# Patient Record
Sex: FEMALE | Race: BLACK OR AFRICAN AMERICAN | NOT HISPANIC OR LATINO | Employment: OTHER | URBAN - METROPOLITAN AREA
[De-identification: names, ages, dates, MRNs, and addresses within clinical notes are randomized per-mention and may not be internally consistent; named-entity substitution may affect disease eponyms.]

---

## 2017-08-23 ENCOUNTER — HISTORICAL (OUTPATIENT)
Dept: LAB | Facility: HOSPITAL | Age: 77
End: 2017-08-23

## 2017-08-23 LAB
CREAT UR-MCNC: 97.9 MG/DL
MICROALBUMIN UR-MCNC: 0.6 MG/DL
MICROALBUMIN/CREAT RATIO PNL UR: 6.1 MG/GM CR (ref 0–30)

## 2017-10-18 ENCOUNTER — HISTORICAL (OUTPATIENT)
Dept: LAB | Facility: HOSPITAL | Age: 77
End: 2017-10-18

## 2017-10-18 LAB
APPEARANCE, UA: ABNORMAL
BACTERIA SPEC CULT: ABNORMAL /HPF
BILIRUB SERPL-MCNC: NEGATIVE MG/DL
BILIRUB UR QL STRIP: ABNORMAL
BLOOD URINE, POC: NEGATIVE
CLARITY, POC UA: CLEAR
COLOR UR: YELLOW
COLOR, POC UA: YELLOW
GLUCOSE (UA): NEGATIVE
GLUCOSE UR QL STRIP: NEGATIVE
HGB UR QL STRIP: NEGATIVE
KETONES UR QL STRIP: NEGATIVE
KETONES UR QL STRIP: NEGATIVE
LEUKOCYTE EST, POC UA: NEGATIVE
LEUKOCYTE ESTERASE UR QL STRIP: NEGATIVE
NITRITE UR QL STRIP: NEGATIVE
NITRITE, POC UA: NEGATIVE
PH UR STRIP: 6 [PH] (ref 5–9)
PH, POC UA: 6
PROT UR QL STRIP: NEGATIVE
PROTEIN, POC: NEGATIVE
RBC #/AREA URNS HPF: ABNORMAL /[HPF]
SP GR UR STRIP: 1.02 (ref 1–1.03)
SPECIFIC GRAVITY, POC UA: 1.01
SQUAMOUS EPITHELIAL, UA: 12 /HPF (ref 0–4)
UROBILINOGEN UR STRIP-ACNC: 0.2
UROBILINOGEN, POC UA: NORMAL
WBC #/AREA URNS HPF: ABNORMAL /[HPF]

## 2017-10-26 ENCOUNTER — HISTORICAL (OUTPATIENT)
Dept: LAB | Facility: HOSPITAL | Age: 77
End: 2017-10-26

## 2017-10-26 LAB
APPEARANCE, UA: CLEAR
BACTERIA SPEC CULT: NORMAL /HPF
BILIRUB UR QL STRIP: NEGATIVE
COLOR UR: YELLOW
GLUCOSE (UA): NEGATIVE
HGB UR QL STRIP: NEGATIVE
KETONES UR QL STRIP: NEGATIVE
LEUKOCYTE ESTERASE UR QL STRIP: NEGATIVE
NITRITE UR QL STRIP: NEGATIVE
PH UR STRIP: 6 [PH] (ref 5–9)
PROT UR QL STRIP: NEGATIVE
RBC #/AREA URNS HPF: NORMAL /[HPF]
SP GR UR STRIP: 1.02 (ref 1–1.03)
SQUAMOUS EPITHELIAL, UA: NORMAL
UROBILINOGEN UR STRIP-ACNC: 0.2
WBC #/AREA URNS HPF: NORMAL /[HPF]

## 2017-11-27 ENCOUNTER — HISTORICAL (OUTPATIENT)
Dept: LAB | Facility: HOSPITAL | Age: 77
End: 2017-11-27

## 2017-11-29 LAB — FINAL CULTURE: NO GROWTH

## 2018-08-29 LAB
BILIRUB SERPL-MCNC: NEGATIVE MG/DL
BLOOD URINE, POC: NEGATIVE
CLARITY, POC UA: CLEAR
COLOR, POC UA: YELLOW
GLUCOSE UR QL STRIP: NEGATIVE
KETONES UR QL STRIP: NEGATIVE
LEUKOCYTE EST, POC UA: NEGATIVE
NITRITE, POC UA: NEGATIVE
PH, POC UA: 5.5
PROTEIN, POC: NEGATIVE
SPECIFIC GRAVITY, POC UA: 1
UROBILINOGEN, POC UA: NORMAL

## 2018-11-29 ENCOUNTER — HISTORICAL (OUTPATIENT)
Dept: LAB | Facility: HOSPITAL | Age: 78
End: 2018-11-29

## 2018-11-29 LAB
APPEARANCE, UA: CLEAR
BACTERIA SPEC CULT: ABNORMAL /HPF
BILIRUB UR QL STRIP: NEGATIVE
COLOR UR: ABNORMAL
GLUCOSE (UA): NEGATIVE
HGB UR QL STRIP: NEGATIVE
KETONES UR QL STRIP: NEGATIVE
LEUKOCYTE ESTERASE UR QL STRIP: ABNORMAL
NITRITE UR QL STRIP: NEGATIVE
PH UR STRIP: 6 [PH] (ref 5–9)
PROT UR QL STRIP: NEGATIVE
RBC #/AREA URNS HPF: ABNORMAL /[HPF]
SP GR UR STRIP: 1.02 (ref 1–1.03)
SQUAMOUS EPITHELIAL, UA: 11 /HPF (ref 0–4)
UROBILINOGEN UR STRIP-ACNC: 0.2
WBC #/AREA URNS HPF: ABNORMAL /[HPF]

## 2018-11-30 LAB — FINAL CULTURE: NORMAL

## 2019-06-13 LAB
ALBUMIN SERPL-MCNC: 4 G/DL (ref 3.5–4.8)
ALBUMIN/GLOB SERPL: 1.4 {RATIO} (ref 1.2–2.2)
ALP SERPL-CCNC: 60 IU/L (ref 39–117)
ALT SERPL-CCNC: 14 IU/L (ref 0–32)
AST SERPL-CCNC: 16 IU/L (ref 0–40)
BILIRUB SERPL-MCNC: 0.4 MG/DL (ref 0–1.2)
BUN SERPL-MCNC: 19 MG/DL (ref 8–27)
CALCIUM SERPL-MCNC: 9.5 MG/DL (ref 8.7–10.3)
CHLORIDE SERPL-SCNC: 102 MMOL/L (ref 96–106)
CO2 SERPL-SCNC: 25 MMOL/L (ref 20–29)
CREAT SERPL-MCNC: 0.92 MG/DL (ref 0.57–1)
CREAT/UREA NIT SERPL: 21 (ref 12–28)
GLOBULIN SER-MCNC: 2.8 G/DL (ref 1.5–4.5)
GLUCOSE SERPL-MCNC: 124 MG/DL (ref 65–99)
HBA1C MFR BLD: 6.4 % (ref 4.8–5.6)
POTASSIUM SERPL-SCNC: 4.7 MMOL/L (ref 3.5–5.2)
PROT SERPL-MCNC: 6.8 G/DL (ref 6–8.5)
SODIUM SERPL-SCNC: 140 MMOL/L (ref 134–144)

## 2019-10-07 ENCOUNTER — HISTORICAL (OUTPATIENT)
Dept: LAB | Facility: HOSPITAL | Age: 79
End: 2019-10-07

## 2019-10-07 LAB
ABS NEUT (OLG): 1.2 X10(3)/MCL (ref 2.1–9.2)
ALBUMIN SERPL-MCNC: 3.6 GM/DL (ref 3.4–5)
ALBUMIN/GLOB SERPL: 1.2 {RATIO}
ALP SERPL-CCNC: 59 UNIT/L (ref 38–126)
ALT SERPL-CCNC: 19 UNIT/L (ref 12–78)
APPEARANCE, UA: CLEAR
AST SERPL-CCNC: 15 UNIT/L (ref 15–37)
BACTERIA SPEC CULT: ABNORMAL /HPF
BASOPHILS # BLD AUTO: 0 X10(3)/MCL (ref 0–0.2)
BASOPHILS NFR BLD AUTO: 1 %
BILIRUB SERPL-MCNC: 0.7 MG/DL (ref 0.2–1)
BILIRUB UR QL STRIP: NEGATIVE
BILIRUBIN DIRECT+TOT PNL SERPL-MCNC: 0.1 MG/DL (ref 0–0.2)
BILIRUBIN DIRECT+TOT PNL SERPL-MCNC: 0.6 MG/DL (ref 0–0.8)
BUN SERPL-MCNC: 20 MG/DL (ref 7–18)
CALCIUM SERPL-MCNC: 9 MG/DL (ref 8.5–10.1)
CHLORIDE SERPL-SCNC: 106 MMOL/L (ref 98–107)
CHOLEST SERPL-MCNC: 248 MG/DL (ref 0–200)
CHOLEST/HDLC SERPL: 4.4 {RATIO} (ref 0–4)
CO2 SERPL-SCNC: 29 MMOL/L (ref 21–32)
COLOR UR: YELLOW
CREAT SERPL-MCNC: 0.84 MG/DL (ref 0.55–1.02)
DEPRECATED CALCIDIOL+CALCIFEROL SERPL-MC: 21.39 NG/ML (ref 30–80)
EOSINOPHIL # BLD AUTO: 0 X10(3)/MCL (ref 0–0.9)
EOSINOPHIL NFR BLD AUTO: 2 %
ERYTHROCYTE [DISTWIDTH] IN BLOOD BY AUTOMATED COUNT: 13.4 % (ref 11.5–17)
EST. AVERAGE GLUCOSE BLD GHB EST-MCNC: 137 MG/DL
GLOBULIN SER-MCNC: 3.1 GM/DL (ref 2.4–3.5)
GLUCOSE (UA): NEGATIVE
GLUCOSE SERPL-MCNC: 115 MG/DL (ref 74–106)
HBA1C MFR BLD: 6.4 % (ref 4.2–6.3)
HCT VFR BLD AUTO: 39 % (ref 37–47)
HDLC SERPL-MCNC: 56 MG/DL (ref 35–60)
HGB BLD-MCNC: 11.7 GM/DL (ref 12–16)
HGB UR QL STRIP: NEGATIVE
KETONES UR QL STRIP: NEGATIVE
LDLC SERPL CALC-MCNC: 173 MG/DL (ref 0–129)
LEUKOCYTE ESTERASE UR QL STRIP: NEGATIVE
LYMPHOCYTES # BLD AUTO: 1.4 X10(3)/MCL (ref 0.6–4.6)
LYMPHOCYTES NFR BLD AUTO: 46 %
MCH RBC QN AUTO: 29.7 PG (ref 27–31)
MCHC RBC AUTO-ENTMCNC: 30 GM/DL (ref 33–36)
MCV RBC AUTO: 99 FL (ref 80–94)
MONOCYTES # BLD AUTO: 0.4 X10(3)/MCL (ref 0.1–1.3)
MONOCYTES NFR BLD AUTO: 12 %
NEUTROPHILS # BLD AUTO: 1.2 X10(3)/MCL (ref 2.1–9.2)
NEUTROPHILS NFR BLD AUTO: 39 %
NITRITE UR QL STRIP: NEGATIVE
PH UR STRIP: 5 [PH] (ref 5–9)
PLATELET # BLD AUTO: 307 X10(3)/MCL (ref 130–400)
PMV BLD AUTO: 9.9 FL (ref 9.4–12.4)
POTASSIUM SERPL-SCNC: 4.3 MMOL/L (ref 3.5–5.1)
PROT SERPL-MCNC: 6.7 GM/DL (ref 6.4–8.2)
PROT UR QL STRIP: NEGATIVE
RBC # BLD AUTO: 3.94 X10(6)/MCL (ref 4.2–5.4)
RBC #/AREA URNS HPF: ABNORMAL /[HPF]
SODIUM SERPL-SCNC: 139 MMOL/L (ref 136–145)
SP GR UR STRIP: 1.02 (ref 1–1.03)
SQUAMOUS EPITHELIAL, UA: 10 /HPF (ref 0–4)
TRIGL SERPL-MCNC: 95 MG/DL (ref 30–150)
TSH SERPL-ACNC: 2.25 MIU/L (ref 0.36–3.74)
UROBILINOGEN UR STRIP-ACNC: 0.2
VLDLC SERPL CALC-MCNC: 19 MG/DL
WBC # SPEC AUTO: 3.1 X10(3)/MCL (ref 4.5–11.5)
WBC #/AREA URNS HPF: 5 /HPF (ref 0–3)

## 2019-10-09 LAB — FINAL CULTURE: NORMAL

## 2021-01-11 LAB
ALBUMIN SERPL-MCNC: 4.3 G/DL (ref 3.7–4.7)
ALBUMIN/GLOB SERPL: 1.4 {RATIO} (ref 1.2–2.2)
ALP SERPL-CCNC: 64 IU/L (ref 39–117)
ALT SERPL-CCNC: 11 IU/L (ref 0–32)
AST SERPL-CCNC: 16 IU/L (ref 0–40)
BASOPHILS # BLD AUTO: 0 X10E3/UL (ref 0–0.2)
BASOPHILS NFR BLD AUTO: 1 %
BILIRUB SERPL-MCNC: 0.3 MG/DL (ref 0–1.2)
BUN SERPL-MCNC: 18 MG/DL (ref 8–27)
CALCIUM SERPL-MCNC: 9.7 MG/DL (ref 8.7–10.3)
CHLORIDE SERPL-SCNC: 100 MMOL/L (ref 96–106)
CHOLEST SERPL-MCNC: 218 MG/DL (ref 100–199)
CHOLEST/HDLC SERPL: 3.6 RATIO (ref 0–4.4)
CO2 SERPL-SCNC: 25 MMOL/L (ref 20–29)
CREAT SERPL-MCNC: 1.04 MG/DL (ref 0.57–1)
CREAT/UREA NIT SERPL: 17 (ref 12–28)
DEPRECATED CALCIDIOL+CALCIFEROL SERPL-MC: 31.5 NG/ML (ref 30–100)
EOSINOPHIL # BLD AUTO: 0 X10E3/UL (ref 0–0.4)
EOSINOPHIL NFR BLD AUTO: 1 %
ERYTHROCYTE [DISTWIDTH] IN BLOOD BY AUTOMATED COUNT: 13.5 % (ref 11.7–15.4)
GLOBULIN SER-MCNC: 3 G/DL (ref 1.5–4.5)
GLUCOSE SERPL-MCNC: 123 MG/DL (ref 65–99)
HBA1C MFR BLD: 6.1 % (ref 4.8–5.6)
HCT VFR BLD AUTO: 40.4 % (ref 34–46.6)
HDLC SERPL-MCNC: 61 MG/DL
HGB BLD-MCNC: 12.4 G/DL (ref 11.1–15.9)
LDLC SERPL CALC-MCNC: 138 MG/DL (ref 0–99)
LYMPHOCYTES # BLD AUTO: 1.3 X10E3/UL (ref 0.7–3.1)
LYMPHOCYTES NFR BLD AUTO: 33 %
MCH RBC QN AUTO: 30.4 PG (ref 26.6–33)
MCHC RBC AUTO-ENTMCNC: 30.7 G/DL (ref 31.5–35.7)
MCV RBC AUTO: 99 FL (ref 79–97)
MICROALBUMIN/CREAT RATIO PNL UR: 15 MG/G CREAT (ref 0–29)
MONOCYTES # BLD AUTO: 0.5 X10E3/UL (ref 0.1–0.9)
MONOCYTES NFR BLD AUTO: 13 %
NEUTROPHILS # BLD AUTO: 2.1 X10E3/UL (ref 1.4–7)
NEUTROPHILS NFR BLD AUTO: 52 %
PLATELET # BLD AUTO: 337 X10E3/UL (ref 150–450)
POTASSIUM SERPL-SCNC: 4.7 MMOL/L (ref 3.5–5.2)
PROT SERPL-MCNC: 7.3 G/DL (ref 6–8.5)
RBC # BLD AUTO: 4.08 X10(6)/MCL (ref 3.77–5.28)
SODIUM SERPL-SCNC: 140 MMOL/L (ref 134–144)
TRIGL SERPL-MCNC: 110 MG/DL (ref 0–149)
TSH SERPL-ACNC: 4.3 MIU/ML (ref 0.45–4.5)
VLDLC SERPL CALC-MCNC: 19 MG/DL (ref 5–40)
WBC # SPEC AUTO: 4 X10E3/UL (ref 3.4–10.8)

## 2022-04-11 ENCOUNTER — HISTORICAL (OUTPATIENT)
Dept: ADMINISTRATIVE | Facility: HOSPITAL | Age: 82
End: 2022-04-11
Payer: MEDICARE

## 2022-04-24 VITALS
HEIGHT: 63 IN | BODY MASS INDEX: 47.97 KG/M2 | WEIGHT: 270.75 LBS | OXYGEN SATURATION: 98 % | SYSTOLIC BLOOD PRESSURE: 135 MMHG | DIASTOLIC BLOOD PRESSURE: 80 MMHG

## 2022-05-06 ENCOUNTER — HISTORICAL (OUTPATIENT)
Dept: ADMINISTRATIVE | Facility: HOSPITAL | Age: 82
End: 2022-05-06
Payer: MEDICARE

## 2022-05-17 RX ORDER — PRAVASTATIN SODIUM 80 MG/1
80 TABLET ORAL DAILY
COMMUNITY
End: 2023-03-23

## 2022-05-17 NOTE — TELEPHONE ENCOUNTER
----- Message from Nellie Ramírez sent at 5/17/2022 12:26 PM CDT -----  Regarding: Refill  Type:  RX Refill Request    Who Called: Patient  Refill or New Rx:refill  RX Name and Strength: Pravastatin.... 80mg  How is the patient currently taking it? (ex. 1XDay):every night  Is this a 30 day or 90 day RX:90  Preferred Pharmacy with phone number:[facility]  Local or Mail Order:  Ordering Provider: dR Cortez  Would the patient rather a call back or a response via [facility]?   Best Call Back Number:[phone]  Additional Information:

## 2022-05-18 RX ORDER — PRAVASTATIN SODIUM 80 MG/1
80 TABLET ORAL DAILY
Qty: 90 TABLET | Refills: 3 | OUTPATIENT
Start: 2022-05-18

## 2022-06-02 ENCOUNTER — TELEPHONE (OUTPATIENT)
Dept: FAMILY MEDICINE | Facility: CLINIC | Age: 82
End: 2022-06-02
Payer: MEDICARE

## 2022-06-02 DIAGNOSIS — Z12.31 VISIT FOR SCREENING MAMMOGRAM: Primary | ICD-10-CM

## 2022-06-02 NOTE — TELEPHONE ENCOUNTER
----- Message from Efrain Wright MA sent at 6/2/2022  3:36 PM CDT -----  Regarding: FW: mammo order    ----- Message -----  From: Ashley Dickerson Patient Care Assistant  Sent: 6/2/2022   3:20 PM CDT  To: Marisa BEARD Staff  Subject: mammo order                                      Type:  Mammogram    Caller is requesting to schedule their annual mammogram appointment.  Order is not listed in EPIC.  Please enter order and contact patient to schedule.  Name of Caller: pt  Where would they like the mammogram performed? [facility]  Would the patient rather a call back or a response via [facility]? C/b  Best Call Back Number: [phone]  Additional Information: please put in pts order for mammogram she has an gilberto in the morning to get this done.

## 2022-09-21 ENCOUNTER — HISTORICAL (OUTPATIENT)
Dept: ADMINISTRATIVE | Facility: HOSPITAL | Age: 82
End: 2022-09-21
Payer: MEDICARE

## 2022-11-17 LAB
LEFT EYE DM RETINOPATHY: NEGATIVE
RIGHT EYE DM RETINOPATHY: NEGATIVE

## 2023-03-23 ENCOUNTER — OFFICE VISIT (OUTPATIENT)
Dept: FAMILY MEDICINE | Facility: CLINIC | Age: 83
End: 2023-03-23
Payer: MEDICARE

## 2023-03-23 VITALS
DIASTOLIC BLOOD PRESSURE: 78 MMHG | RESPIRATION RATE: 17 BRPM | HEART RATE: 63 BPM | WEIGHT: 270.88 LBS | HEIGHT: 65 IN | SYSTOLIC BLOOD PRESSURE: 138 MMHG | OXYGEN SATURATION: 99 % | TEMPERATURE: 98 F | BODY MASS INDEX: 45.13 KG/M2

## 2023-03-23 DIAGNOSIS — E66.9 DIABETES MELLITUS TYPE 2 IN OBESE: ICD-10-CM

## 2023-03-23 DIAGNOSIS — N90.7 VULVAR CYST: Primary | ICD-10-CM

## 2023-03-23 DIAGNOSIS — E11.69 DIABETES MELLITUS TYPE 2 IN OBESE: ICD-10-CM

## 2023-03-23 DIAGNOSIS — E78.2 MIXED HYPERLIPIDEMIA: ICD-10-CM

## 2023-03-23 PROCEDURE — 3078F PR MOST RECENT DIASTOLIC BLOOD PRESSURE < 80 MM HG: ICD-10-PCS | Mod: CPTII,,, | Performed by: FAMILY MEDICINE

## 2023-03-23 PROCEDURE — 3075F SYST BP GE 130 - 139MM HG: CPT | Mod: CPTII,,, | Performed by: FAMILY MEDICINE

## 2023-03-23 PROCEDURE — 1160F PR REVIEW ALL MEDS BY PRESCRIBER/CLIN PHARMACIST DOCUMENTED: ICD-10-PCS | Mod: CPTII,,, | Performed by: FAMILY MEDICINE

## 2023-03-23 PROCEDURE — 99213 OFFICE O/P EST LOW 20 MIN: CPT | Mod: ,,, | Performed by: FAMILY MEDICINE

## 2023-03-23 PROCEDURE — 1160F RVW MEDS BY RX/DR IN RCRD: CPT | Mod: CPTII,,, | Performed by: FAMILY MEDICINE

## 2023-03-23 PROCEDURE — 1159F PR MEDICATION LIST DOCUMENTED IN MEDICAL RECORD: ICD-10-PCS | Mod: CPTII,,, | Performed by: FAMILY MEDICINE

## 2023-03-23 PROCEDURE — 3075F PR MOST RECENT SYSTOLIC BLOOD PRESS GE 130-139MM HG: ICD-10-PCS | Mod: CPTII,,, | Performed by: FAMILY MEDICINE

## 2023-03-23 PROCEDURE — 3078F DIAST BP <80 MM HG: CPT | Mod: CPTII,,, | Performed by: FAMILY MEDICINE

## 2023-03-23 PROCEDURE — 99213 PR OFFICE/OUTPT VISIT, EST, LEVL III, 20-29 MIN: ICD-10-PCS | Mod: ,,, | Performed by: FAMILY MEDICINE

## 2023-03-23 PROCEDURE — 1159F MED LIST DOCD IN RCRD: CPT | Mod: CPTII,,, | Performed by: FAMILY MEDICINE

## 2023-03-23 PROCEDURE — 1101F PT FALLS ASSESS-DOCD LE1/YR: CPT | Mod: CPTII,,, | Performed by: FAMILY MEDICINE

## 2023-03-23 PROCEDURE — 3288F PR FALLS RISK ASSESSMENT DOCUMENTED: ICD-10-PCS | Mod: CPTII,,, | Performed by: FAMILY MEDICINE

## 2023-03-23 PROCEDURE — 3288F FALL RISK ASSESSMENT DOCD: CPT | Mod: CPTII,,, | Performed by: FAMILY MEDICINE

## 2023-03-23 PROCEDURE — 1126F PR PAIN SEVERITY QUANTIFIED, NO PAIN PRESENT: ICD-10-PCS | Mod: CPTII,,, | Performed by: FAMILY MEDICINE

## 2023-03-23 PROCEDURE — 1101F PR PT FALLS ASSESS DOC 0-1 FALLS W/OUT INJ PAST YR: ICD-10-PCS | Mod: CPTII,,, | Performed by: FAMILY MEDICINE

## 2023-03-23 PROCEDURE — 1126F AMNT PAIN NOTED NONE PRSNT: CPT | Mod: CPTII,,, | Performed by: FAMILY MEDICINE

## 2023-03-23 RX ORDER — PRAVASTATIN SODIUM 40 MG/1
40 TABLET ORAL DAILY
Qty: 30 TABLET | Refills: 3 | Status: SHIPPED | OUTPATIENT
Start: 2023-03-23 | End: 2023-04-03

## 2023-03-23 NOTE — PROGRESS NOTES
Subjective:      Patient ID: Desire Connors is a 82 y.o. female.    Chief Complaint: Vaginal Cyst    Disclaimer:  This note is prepared using voice recognition software and as such is likely to have errors despite attempts at proofreading. Please contact me for questions.     82-year-old female who presents for vaginal nodule/cyst that she states has been present for multiple weeks.  She denies any pain, bleeding or discharge.  She also denies any pruritis.  The patient states that she palpated the nodule and was concerned.  Patient has a history of hyperlipidemia, diabetes mellitus type 2, hypertension and microalbuminuria.  The patient requests to change Crestor back to pravastatin.  She states that since starting Crestor she has had myalgias and she is unsure of why the medication was changed.  She denies any side effects while taking pravastatin.    Past Medical History:   Diagnosis Date    CAD (coronary artery disease)     Chronic sinusitis, unspecified     Diabetic neuropathy     Essential (primary) hypertension     GERD (gastroesophageal reflux disease)     Hiatal hernia     Hypercholesteremia     Microalbuminuria     Obesity, unspecified     PAD (peripheral artery disease)     Peripheral edema     Seasonal allergic rhinitis     Type 2 diabetes mellitus without complications     Unspecified cataract     Unspecified dementia without behavioral disturbance     Vitamin D deficiency         Current Outpatient Medications on File Prior to Visit   Medication Sig Dispense Refill    aspirin (ECOTRIN) 81 MG EC tablet Take 81 mg by mouth once daily.      blood sugar diagnostic Strp 100 each by Other route once daily. USE TO CHECK FASTING CBG      blood-glucose meter kit 1 each by Other route once daily. USE TO CHECK CBG      donepeziL (ARICEPT) 10 MG tablet TAKE 1 TABLET BY MOUTH EVERYDAY AT BEDTIME 90 tablet 3    fluticasone propionate (FLONASE) 50 mcg/actuation nasal spray USE 2 SPRAYS IN EACH NOSTRIL DAILY AS NEEDED  FOR ALLERGY SYMPTOMS 16 mL 11    furosemide (LASIX) 20 MG tablet Take 20 mg by mouth once daily.      gabapentin (NEURONTIN) 300 MG capsule TAKE 1 CAP TWICE DAILY AS NEEDED FOR NERVE PAIN 60 capsule 5    hydrALAZINE (APRESOLINE) 100 MG tablet Take 100 mg by mouth 3 (three) times daily.      JARDIANCE 10 mg tablet Take 10 mg by mouth once daily.      lancets Misc 100 each by Other route once daily. USE TO CHECK FASTING CBG      losartan (COZAAR) 100 MG tablet TAKE 1 TABLET BY MOUTH EVERY DAY 90 tablet 3    pioglitazone (ACTOS) 15 MG tablet TAKE 1 TABLET BY MOUTH EVERY DAY 90 tablet 3    rosuvastatin (CRESTOR) 40 MG Tab Take 40 mg by mouth once daily.      vitamin D (VITAMIN D3) 1000 units Tab Take 1 tablet by mouth once daily.      amLODIPine (NORVASC) 5 MG tablet Take 1 tablet by mouth once daily.      diclofenac (VOLTAREN) 75 MG EC tablet Take 75 mg by mouth 2 (two) times daily as needed.      pantoprazole (PROTONIX) 40 MG tablet Take 1 tablet by mouth once daily.      prasugreL (EFFIENT) 10 mg Tab Take 10 mg by mouth once daily.      pravastatin (PRAVACHOL) 80 MG tablet Take 80 mg by mouth once daily.       No current facility-administered medications on file prior to visit.        Review of patient's allergies indicates:   Allergen Reactions    Ciprofloxacin Swelling     Other reaction(s): Swelling    Sulfa (sulfonamide antibiotics)         Review of Systems   Constitutional:  Negative for chills, diaphoresis and fever.   Eyes:  Negative for blurred vision and double vision.   Respiratory:  Negative for cough and shortness of breath.    Cardiovascular:  Negative for chest pain and leg swelling.   Gastrointestinal:  Negative for abdominal pain, diarrhea, nausea and vomiting.   Genitourinary:         Vulvar nodule   Skin:  Negative for rash.   Neurological:  Negative for dizziness, tremors and headaches.     Objective:     Vitals:    03/23/23 1319   BP: 138/78   BP Location: Right arm   Patient Position: Sitting  "  BP Method: Large (Manual)   Pulse: 63   Resp: 17   Temp: 97.9 °F (36.6 °C)   TempSrc: Oral   SpO2: 99%   Weight: 122.9 kg (270 lb 14.4 oz)   Height: 5' 5" (1.651 m)     Physical Exam  Exam conducted with a chaperone present (Kimberly Cleveland LPN).   Constitutional:       General: She is not in acute distress.     Appearance: Normal appearance. She is not ill-appearing, toxic-appearing or diaphoretic.   HENT:      Head: Normocephalic and atraumatic.      Right Ear: External ear normal.      Left Ear: External ear normal.      Nose: Nose normal.   Eyes:      Extraocular Movements: Extraocular movements intact.      Conjunctiva/sclera: Conjunctivae normal.   Pulmonary:      Effort: Pulmonary effort is normal. No respiratory distress.   Genitourinary:      Musculoskeletal:      Cervical back: Normal range of motion.   Skin:     Coloration: Skin is not pale.   Neurological:      General: No focal deficit present.      Mental Status: She is alert and oriented to person, place, and time. Mental status is at baseline.   Psychiatric:         Mood and Affect: Mood normal.         Behavior: Behavior normal.         Thought Content: Thought content normal.         Judgment: Judgment normal.       Assessment:     1. Vulvar cyst    2. Mixed hyperlipidemia    3. Diabetes mellitus type 2 in obese      Plan:     1. Vulvar cyst  - HSV 1 & 2, IgG; Future  - SYPHILIS ANTIBODY (WITH REFLEX RPR); Future  Warm compresses at least TID.  May also have sitz baths.  Non-tender, no erythema, or discharge at this time.  Monitor for change in size or shape of cyst.  Will consider gynecology referral.    2. Mixed hyperlipidemia  - CK; Future  - Lipid Panel; Future  Patient admits to myalgias while taking rosuvastatin but not while taking pravastatin previously.  She requests meditation be changed. Due to myalgias will start Pravastatin 40 mg PO daily.  Medication to be titrated as tolerated.  Repeat CK and FLP ordered and pending.    3. " Diabetes mellitus type 2 in obese  - Hemoglobin A1C; Future  - Microalbumin/Creatinine Ratio, Urine  - Comprehensive Metabolic Panel; Future  Labs ordered above. RTC in 2 weeks of DM2 follow up.     Follow up in about 2 weeks (around 4/6/2023) for Labial cyst, HLD and DM2.  Desire Connors was given education on their disease process and medications.

## 2023-04-27 ENCOUNTER — OFFICE VISIT (OUTPATIENT)
Dept: FAMILY MEDICINE | Facility: CLINIC | Age: 83
End: 2023-04-27
Payer: MEDICARE

## 2023-04-27 VITALS
RESPIRATION RATE: 17 BRPM | DIASTOLIC BLOOD PRESSURE: 60 MMHG | HEART RATE: 65 BPM | WEIGHT: 270 LBS | TEMPERATURE: 98 F | OXYGEN SATURATION: 97 % | HEIGHT: 65 IN | BODY MASS INDEX: 44.98 KG/M2 | SYSTOLIC BLOOD PRESSURE: 122 MMHG

## 2023-04-27 DIAGNOSIS — N90.7 VULVAR CYST: ICD-10-CM

## 2023-04-27 DIAGNOSIS — R60.0 LOWER EXTREMITY EDEMA: ICD-10-CM

## 2023-04-27 DIAGNOSIS — I10 ELEVATED BLOOD PRESSURE READING IN OFFICE WITH DIAGNOSIS OF HYPERTENSION: Primary | ICD-10-CM

## 2023-04-27 PROCEDURE — 1126F AMNT PAIN NOTED NONE PRSNT: CPT | Mod: CPTII,,, | Performed by: FAMILY MEDICINE

## 2023-04-27 PROCEDURE — 3074F SYST BP LT 130 MM HG: CPT | Mod: CPTII,,, | Performed by: FAMILY MEDICINE

## 2023-04-27 PROCEDURE — 3078F PR MOST RECENT DIASTOLIC BLOOD PRESSURE < 80 MM HG: ICD-10-PCS | Mod: CPTII,,, | Performed by: FAMILY MEDICINE

## 2023-04-27 PROCEDURE — 3074F PR MOST RECENT SYSTOLIC BLOOD PRESSURE < 130 MM HG: ICD-10-PCS | Mod: CPTII,,, | Performed by: FAMILY MEDICINE

## 2023-04-27 PROCEDURE — 1160F RVW MEDS BY RX/DR IN RCRD: CPT | Mod: CPTII,,, | Performed by: FAMILY MEDICINE

## 2023-04-27 PROCEDURE — 1160F PR REVIEW ALL MEDS BY PRESCRIBER/CLIN PHARMACIST DOCUMENTED: ICD-10-PCS | Mod: CPTII,,, | Performed by: FAMILY MEDICINE

## 2023-04-27 PROCEDURE — 99213 OFFICE O/P EST LOW 20 MIN: CPT | Mod: ,,, | Performed by: FAMILY MEDICINE

## 2023-04-27 PROCEDURE — 3078F DIAST BP <80 MM HG: CPT | Mod: CPTII,,, | Performed by: FAMILY MEDICINE

## 2023-04-27 PROCEDURE — 99213 PR OFFICE/OUTPT VISIT, EST, LEVL III, 20-29 MIN: ICD-10-PCS | Mod: ,,, | Performed by: FAMILY MEDICINE

## 2023-04-27 PROCEDURE — 1159F MED LIST DOCD IN RCRD: CPT | Mod: CPTII,,, | Performed by: FAMILY MEDICINE

## 2023-04-27 PROCEDURE — 1126F PR PAIN SEVERITY QUANTIFIED, NO PAIN PRESENT: ICD-10-PCS | Mod: CPTII,,, | Performed by: FAMILY MEDICINE

## 2023-04-27 PROCEDURE — 1159F PR MEDICATION LIST DOCUMENTED IN MEDICAL RECORD: ICD-10-PCS | Mod: CPTII,,, | Performed by: FAMILY MEDICINE

## 2023-04-27 RX ORDER — MUPIROCIN 20 MG/G
OINTMENT TOPICAL 2 TIMES DAILY
Qty: 15 G | Refills: 0 | Status: SHIPPED | OUTPATIENT
Start: 2023-04-27

## 2023-04-27 NOTE — PROGRESS NOTES
Subjective:      Patient ID: Desire Connors is a 82 y.o. female.    Chief Complaint: Follow-up (Labial Cyst) and Bump On Labia    Disclaimer:  This note is prepared using voice recognition software and as such is likely to have errors despite attempts at proofreading. Please contact me for questions.     82-year-old female who presents for follow up of vulvar cyst that she states has been present for multiple weeks.  She denies any pain, bleeding or discharge. She states the cyst seems to have decreased in size but is still present. She admits to compliance with warm compresses/sitz baths. STD screening labs were ordered but not completed.  HTN: The patient was found to have significantly elevated BP upon arrival. She states that she took her medication in the parking lot prior to walking into the clinic. She denies SOB, chest pain, nausea, vomiting, or dizziness. She admits to occasional headaches but none currently.    Past Medical History:   Diagnosis Date    CAD (coronary artery disease)     Chronic sinusitis, unspecified     Diabetic neuropathy     Essential (primary) hypertension     GERD (gastroesophageal reflux disease)     Hiatal hernia     Hypercholesteremia     Microalbuminuria     Obesity, unspecified     PAD (peripheral artery disease)     Peripheral edema     Seasonal allergic rhinitis     Type 2 diabetes mellitus without complications     Unspecified cataract     Unspecified dementia without behavioral disturbance     Vitamin D deficiency         Current Outpatient Medications on File Prior to Visit   Medication Sig Dispense Refill    amLODIPine (NORVASC) 5 MG tablet Take 1 tablet by mouth once daily.      aspirin (ECOTRIN) 81 MG EC tablet Take 81 mg by mouth once daily.      blood sugar diagnostic Strp 100 each by Other route once daily. USE TO CHECK FASTING CBG      blood-glucose meter kit 1 each by Other route once daily. USE TO CHECK CBG      diclofenac (VOLTAREN) 75 MG EC tablet Take 75 mg by mouth  2 (two) times daily as needed.      donepeziL (ARICEPT) 10 MG tablet TAKE 1 TABLET BY MOUTH EVERYDAY AT BEDTIME 90 tablet 3    fluticasone propionate (FLONASE) 50 mcg/actuation nasal spray USE 2 SPRAYS IN EACH NOSTRIL DAILY AS NEEDED FOR ALLERGY SYMPTOMS 16 mL 11    furosemide (LASIX) 20 MG tablet Take 20 mg by mouth once daily.      gabapentin (NEURONTIN) 300 MG capsule TAKE 1 CAP TWICE DAILY AS NEEDED FOR NERVE PAIN 60 capsule 5    hydrALAZINE (APRESOLINE) 100 MG tablet TAKE 1 TABLET BY MOUTH THREE TIMES A  tablet 3    JARDIANCE 10 mg tablet Take 10 mg by mouth once daily.      lancets Misc 100 each by Other route once daily. USE TO CHECK FASTING CBG      losartan (COZAAR) 100 MG tablet TAKE 1 TABLET BY MOUTH EVERY DAY 90 tablet 3    pantoprazole (PROTONIX) 40 MG tablet Take 1 tablet by mouth once daily.      pioglitazone (ACTOS) 15 MG tablet TAKE 1 TABLET BY MOUTH EVERY DAY 90 tablet 3    prasugreL (EFFIENT) 10 mg Tab Take 10 mg by mouth once daily.      pravastatin (PRAVACHOL) 40 MG tablet TAKE 1 TABLET BY MOUTH EVERY DAY 90 tablet 3    vitamin D (VITAMIN D3) 1000 units Tab Take 1 tablet by mouth once daily.       No current facility-administered medications on file prior to visit.        Review of patient's allergies indicates:   Allergen Reactions    Ciprofloxacin Swelling     Other reaction(s): Swelling    Sulfa (sulfonamide antibiotics)         Review of Systems   Constitutional:  Negative for chills, diaphoresis and fever.   Eyes:  Negative for blurred vision and double vision.   Respiratory:  Negative for cough and shortness of breath.    Cardiovascular:  Positive for leg swelling. Negative for chest pain.   Gastrointestinal:  Negative for abdominal pain, diarrhea, nausea and vomiting.   Genitourinary:         Vulvar nodule   Skin:  Negative for rash.   Neurological:  Negative for dizziness, tremors and headaches.     Objective:     Vitals:    04/27/23 1311 04/27/23 1413   BP: (!) 210/90 122/60   BP  "Location: Left arm Left arm   Patient Position: Sitting Sitting   BP Method: Large (Automatic) Thigh Cuff (Manual)   Pulse: 64 65   Resp: 17    Temp: 97.7 °F (36.5 °C)    TempSrc: Oral    SpO2: 97%    Weight: 122.5 kg (270 lb)    Height: 5' 5" (1.651 m)      Physical Exam  Vitals reviewed. Exam conducted with a chaperone present (Kimberly Cleveland LPN).   Constitutional:       General: She is not in acute distress.     Appearance: Normal appearance. She is not ill-appearing, toxic-appearing or diaphoretic.   HENT:      Head: Normocephalic and atraumatic.      Right Ear: External ear normal.      Left Ear: External ear normal.      Nose: Nose normal.   Eyes:      General:         Right eye: No discharge.         Left eye: No discharge.      Extraocular Movements: Extraocular movements intact.      Conjunctiva/sclera: Conjunctivae normal.   Cardiovascular:      Rate and Rhythm: Normal rate and regular rhythm.      Pulses: Normal pulses.      Heart sounds: Normal heart sounds. No murmur heard.    No friction rub. No gallop.   Pulmonary:      Effort: Pulmonary effort is normal. No respiratory distress.      Breath sounds: Normal breath sounds. No stridor. No wheezing, rhonchi or rales.   Genitourinary:      Musculoskeletal:         General: Normal range of motion.      Cervical back: Normal range of motion and neck supple. No rigidity.      Right lower le+ Edema present.      Left lower le+ Edema present.      Comments: Edema intermittent on bilateral anterior lower legs, non-tender.   Skin:     Coloration: Skin is not pale.   Neurological:      General: No focal deficit present.      Mental Status: She is alert and oriented to person, place, and time. Mental status is at baseline.   Psychiatric:         Mood and Affect: Mood normal.         Behavior: Behavior normal.         Thought Content: Thought content normal.         Judgment: Judgment normal.       Assessment:     1. Elevated blood pressure reading in " office with diagnosis of hypertension    2. Vulvar cyst    3. Lower extremity edema      Plan:     1. Elevated blood pressure reading in office with diagnosis of hypertension   Repeat BP at goal.  Continue current medications: Amlodipine 5 mg daily, hydralazine 100 mg TID, and Losartan 100 mg daily.  Encouraged medication compliance.  Low sodium diet and exercise recommended  Monitor BP at home and notify MD if sBP >160 or <90. Also notify MD if dBP >100 or <60.  Limit caffeine intake.  Seek immediate medical treatment for chest pain, SOB, LE edema, severe headache, blurred vision, dizziness, slurred speech, any new or worsening symptoms.    2. Vulvar cyst  Encouraged to complete labs (HSV, Syphils Ab).  Warm compresses at least TID.  May also have sitz baths.  Non-tender, no erythema, no discharge.  Start mupirocin ointment as prescribed.  Medications Ordered This Encounter   Medications    mupirocin (BACTROBAN) 2 % ointment     Sig: Apply topically 2 (two) times daily.     Dispense:  15 g     Refill:  0       3. Lower extremity edema  Hx of PAD and CAD.  Prescribed Lasix but noncompliant.  Encourage compliance with Lasix 20 mg daily PRN  Edema bilateral, mild and anterior leg.

## 2023-05-01 ENCOUNTER — TELEPHONE (OUTPATIENT)
Dept: FAMILY MEDICINE | Facility: CLINIC | Age: 83
End: 2023-05-01
Payer: MEDICARE

## 2023-05-01 NOTE — TELEPHONE ENCOUNTER
----- Message from Jazmin Lyn sent at 5/1/2023  9:06 AM CDT -----  .Type:  Needs Medical Advice    Who Called: pt  Symptoms (please be specific):    How long has patient had these symptoms:    Pharmacy name and phone #:    Would the patient rather a call back or a response via [facility]? cb  Best Call Back Number: [phone]  Additional Information: pt ask for her labs to be fax to [facility] please call pt once done

## 2023-06-12 LAB — HBA1C MFR BLD: 5.6 % (ref 4–6)

## 2023-06-15 ENCOUNTER — TELEPHONE (OUTPATIENT)
Dept: FAMILY MEDICINE | Facility: CLINIC | Age: 83
End: 2023-06-15

## 2023-06-15 ENCOUNTER — DOCUMENTATION ONLY (OUTPATIENT)
Dept: FAMILY MEDICINE | Facility: CLINIC | Age: 83
End: 2023-06-15
Payer: MEDICARE

## 2023-06-15 LAB
ALBUMIN SERPL-MCNC: 4.3 G/DL (ref 3.7–4.7)
ALBUMIN/CREAT UR: 35 MG/G CREAT (ref 0–29)
ALBUMIN/GLOB SERPL: 1.4 {RATIO} (ref 1.2–2.2)
ALP SERPL-CCNC: 57 IU/L (ref 44–121)
ALT SERPL-CCNC: 11 IU/L (ref 0–32)
AST SERPL-CCNC: 18 IU/L (ref 0–40)
BILIRUB SERPL-MCNC: 0.4 MG/DL (ref 0–1.2)
BUN SERPL-MCNC: 18 MG/DL (ref 8–27)
BUN/CREAT SERPL: 21 (ref 12–28)
CALCIUM SERPL-MCNC: 9.6 MG/DL (ref 8.7–10.3)
CHLORIDE SERPL-SCNC: 103 MMOL/L (ref 96–106)
CHOLEST SERPL-MCNC: 245 MG/DL (ref 100–199)
CK SERPL-CCNC: 103 U/L (ref 32–182)
CO2 SERPL-SCNC: 23 MMOL/L (ref 20–29)
CREAT SERPL-MCNC: 0.85 MG/DL (ref 0.57–1)
CREAT UR-MCNC: 151.7 MG/DL
EST. GFR  (NO RACE VARIABLE): 69 ML/MIN/1.73
GLOBULIN SER CALC-MCNC: 3 G/DL (ref 1.5–4.5)
GLUCOSE SERPL-MCNC: 98 MG/DL (ref 70–99)
HBA1C MFR BLD: 5.6 % (ref 4.8–5.6)
HDLC SERPL-MCNC: 65 MG/DL
HSV1 IGG SER IA-ACNC: >62.2 INDEX (ref 0–0.9)
HSV2 IGG SER IA-ACNC: <0.91 INDEX (ref 0–0.9)
LDLC SERPL CALC-MCNC: 164 MG/DL (ref 0–99)
MICROALBUMIN UR-MCNC: 53.7 UG/ML
POTASSIUM SERPL-SCNC: 4.4 MMOL/L (ref 3.5–5.2)
PROT SERPL-MCNC: 7.3 G/DL (ref 6–8.5)
RPR SER QL: NON REACTIVE
SODIUM SERPL-SCNC: 139 MMOL/L (ref 134–144)
TRIGL SERPL-MCNC: 94 MG/DL (ref 0–149)
VLDLC SERPL CALC-MCNC: 16 MG/DL (ref 5–40)

## 2023-06-15 NOTE — TELEPHONE ENCOUNTER
TALKED WITH PT I PUT HER DOWN FOR 6/28 she said she will talk with the person that brings her. But her appt is made .

## 2023-06-15 NOTE — TELEPHONE ENCOUNTER
----- Message from Lotus Cunningham MD sent at 6/15/2023  2:25 PM CDT -----  I haven't seen this patient in over a year, labs were ordered by Dr. Lugo, but she keeps forwarding them back to me, please schedule patient an appointment in the next 1-2 weeks for followup and to discuss results with me. Thanks.

## 2023-06-21 DIAGNOSIS — F44.89 CONFUSION STATE: Primary | ICD-10-CM

## 2023-06-21 RX ORDER — DONEPEZIL HYDROCHLORIDE 10 MG/1
TABLET, FILM COATED ORAL
Qty: 90 TABLET | Refills: 3 | Status: SHIPPED | OUTPATIENT
Start: 2023-06-21

## 2023-06-28 ENCOUNTER — OFFICE VISIT (OUTPATIENT)
Dept: FAMILY MEDICINE | Facility: CLINIC | Age: 83
End: 2023-06-28
Payer: MEDICARE

## 2023-06-28 VITALS
OXYGEN SATURATION: 97 % | HEART RATE: 96 BPM | BODY MASS INDEX: 44.93 KG/M2 | WEIGHT: 270 LBS | DIASTOLIC BLOOD PRESSURE: 84 MMHG | SYSTOLIC BLOOD PRESSURE: 138 MMHG

## 2023-06-28 DIAGNOSIS — E11.59 HYPERTENSION ASSOCIATED WITH DIABETES: ICD-10-CM

## 2023-06-28 DIAGNOSIS — I15.2 HYPERTENSION ASSOCIATED WITH DIABETES: ICD-10-CM

## 2023-06-28 DIAGNOSIS — E78.5 HYPERLIPIDEMIA ASSOCIATED WITH TYPE 2 DIABETES MELLITUS: ICD-10-CM

## 2023-06-28 DIAGNOSIS — E66.01 MORBID OBESITY: ICD-10-CM

## 2023-06-28 DIAGNOSIS — E11.42 CONTROLLED TYPE 2 DIABETES MELLITUS WITH DIABETIC POLYNEUROPATHY, WITHOUT LONG-TERM CURRENT USE OF INSULIN: ICD-10-CM

## 2023-06-28 DIAGNOSIS — I77.9 PERIPHERAL ARTERIAL OCCLUSIVE DISEASE: ICD-10-CM

## 2023-06-28 DIAGNOSIS — Z12.31 VISIT FOR SCREENING MAMMOGRAM: ICD-10-CM

## 2023-06-28 DIAGNOSIS — L30.9 DERMATITIS: ICD-10-CM

## 2023-06-28 DIAGNOSIS — E11.69 HYPERLIPIDEMIA ASSOCIATED WITH TYPE 2 DIABETES MELLITUS: ICD-10-CM

## 2023-06-28 DIAGNOSIS — Z13.820 SCREENING FOR OSTEOPOROSIS: ICD-10-CM

## 2023-06-28 DIAGNOSIS — Z78.0 POSTMENOPAUSAL: ICD-10-CM

## 2023-06-28 DIAGNOSIS — F03.90 DEMENTIA, UNSPECIFIED DEMENTIA SEVERITY, UNSPECIFIED DEMENTIA TYPE, UNSPECIFIED WHETHER BEHAVIORAL, PSYCHOTIC, OR MOOD DISTURBANCE OR ANXIETY: ICD-10-CM

## 2023-06-28 DIAGNOSIS — Z00.00 MEDICARE ANNUAL WELLNESS VISIT, SUBSEQUENT: Primary | ICD-10-CM

## 2023-06-28 PROCEDURE — 1158F ADVNC CARE PLAN TLK DOCD: CPT | Mod: CPTII,,, | Performed by: FAMILY MEDICINE

## 2023-06-28 PROCEDURE — 3288F PR FALLS RISK ASSESSMENT DOCUMENTED: ICD-10-PCS | Mod: CPTII,,, | Performed by: FAMILY MEDICINE

## 2023-06-28 PROCEDURE — 1160F PR REVIEW ALL MEDS BY PRESCRIBER/CLIN PHARMACIST DOCUMENTED: ICD-10-PCS | Mod: CPTII,,, | Performed by: FAMILY MEDICINE

## 2023-06-28 PROCEDURE — 1159F PR MEDICATION LIST DOCUMENTED IN MEDICAL RECORD: ICD-10-PCS | Mod: CPTII,,, | Performed by: FAMILY MEDICINE

## 2023-06-28 PROCEDURE — 1126F PR PAIN SEVERITY QUANTIFIED, NO PAIN PRESENT: ICD-10-PCS | Mod: CPTII,,, | Performed by: FAMILY MEDICINE

## 2023-06-28 PROCEDURE — 3079F PR MOST RECENT DIASTOLIC BLOOD PRESSURE 80-89 MM HG: ICD-10-PCS | Mod: CPTII,,, | Performed by: FAMILY MEDICINE

## 2023-06-28 PROCEDURE — 1126F AMNT PAIN NOTED NONE PRSNT: CPT | Mod: CPTII,,, | Performed by: FAMILY MEDICINE

## 2023-06-28 PROCEDURE — 1124F PR ADV CARE PLAN DISCUSSED, UNABLE/UNWILL DOC PLAN OR SURROGATE: ICD-10-PCS | Mod: CPTII,,, | Performed by: FAMILY MEDICINE

## 2023-06-28 PROCEDURE — 1158F PR ADVANCE CARE PLANNING DISCUSS DOCUMENTED IN MEDICAL RECORD: ICD-10-PCS | Mod: CPTII,,, | Performed by: FAMILY MEDICINE

## 2023-06-28 PROCEDURE — 1101F PR PT FALLS ASSESS DOC 0-1 FALLS W/OUT INJ PAST YR: ICD-10-PCS | Mod: CPTII,,, | Performed by: FAMILY MEDICINE

## 2023-06-28 PROCEDURE — 1101F PT FALLS ASSESS-DOCD LE1/YR: CPT | Mod: CPTII,,, | Performed by: FAMILY MEDICINE

## 2023-06-28 PROCEDURE — G0439 PPPS, SUBSEQ VISIT: HCPCS | Mod: ,,, | Performed by: FAMILY MEDICINE

## 2023-06-28 PROCEDURE — 3079F DIAST BP 80-89 MM HG: CPT | Mod: CPTII,,, | Performed by: FAMILY MEDICINE

## 2023-06-28 PROCEDURE — 1160F RVW MEDS BY RX/DR IN RCRD: CPT | Mod: CPTII,,, | Performed by: FAMILY MEDICINE

## 2023-06-28 PROCEDURE — 3075F PR MOST RECENT SYSTOLIC BLOOD PRESS GE 130-139MM HG: ICD-10-PCS | Mod: CPTII,,, | Performed by: FAMILY MEDICINE

## 2023-06-28 PROCEDURE — 1159F MED LIST DOCD IN RCRD: CPT | Mod: CPTII,,, | Performed by: FAMILY MEDICINE

## 2023-06-28 PROCEDURE — 3288F FALL RISK ASSESSMENT DOCD: CPT | Mod: CPTII,,, | Performed by: FAMILY MEDICINE

## 2023-06-28 PROCEDURE — 3075F SYST BP GE 130 - 139MM HG: CPT | Mod: CPTII,,, | Performed by: FAMILY MEDICINE

## 2023-06-28 PROCEDURE — 99214 OFFICE O/P EST MOD 30 MIN: CPT | Mod: 25,,, | Performed by: FAMILY MEDICINE

## 2023-06-28 PROCEDURE — G0439 PR MEDICARE ANNUAL WELLNESS SUBSEQUENT VISIT: ICD-10-PCS | Mod: ,,, | Performed by: FAMILY MEDICINE

## 2023-06-28 PROCEDURE — 99214 PR OFFICE/OUTPT VISIT, EST, LEVL IV, 30-39 MIN: ICD-10-PCS | Mod: 25,,, | Performed by: FAMILY MEDICINE

## 2023-06-28 PROCEDURE — 1124F ACP DISCUSS-NO DSCNMKR DOCD: CPT | Mod: CPTII,,, | Performed by: FAMILY MEDICINE

## 2023-06-28 RX ORDER — BETAMETHASONE VALERATE 1.2 MG/G
CREAM TOPICAL 2 TIMES DAILY PRN
Qty: 45 G | Refills: 1 | Status: SHIPPED | OUTPATIENT
Start: 2023-06-28

## 2023-06-28 RX ORDER — PRAVASTATIN SODIUM 80 MG/1
80 TABLET ORAL NIGHTLY
Qty: 90 TABLET | Refills: 3 | Status: SHIPPED | OUTPATIENT
Start: 2023-06-28 | End: 2024-06-27

## 2023-06-28 NOTE — PROGRESS NOTES
Patient ID: [id number]     Chief Complaint: Results        HPI:     Desire Connors is a 82 y.o. female here today for a Medicare Wellness.   Well Adult History   The patient presents for well adult exam. The patient's general health status is described as good. The patient's diet is described as balanced. Exercise: none. Associated symptoms consist of denies fatigue, denies weight loss, denies weight gain, denies headache, denies hearing loss and denies vision changes. Last menstrual period: patient no longer menstruates due to hysterectomy, needs DEXA scan scheduled. Additional pertinent history: last eye exam: 2022 (With Dr. Fernandez-Ophthalmology), last mammogram: 05/13/2021 ([facility] at [facility], she would like scheduled), last colonoscopy: 06/24/2021 (with GI (Dr. Calderón), seat belt use, no caffeine use, tobacco use none, no alcohol use and She had labs done on 06/12/2023, here to discuss the results today. DM II is controlled with Rx, she doesn't like the side effects from Jardiance, open to trying Mounjaro for diabetes and weight loss, she denies family history of medullary thyroid caner or MEN II or personal history of pancreatitis, last fasting CBG was 140, denies polyuria, polyphagia, or polydipsia. Neuropathy is controlled with Gabapentin p.r.n. She denies adverse Rx side effects. She refuses COVID-19 vaccine, Shingrix, and Tdap because she states she took all of the shots she needed to take and is UTD on all other vaccines. HTN/PAD is controlled with Rx, asymptomatic and followed by Cardiology (Dr. Adam). Hypercholesterolemia is controlled with Rx, asymptomatic, no side effects. She is obese, wants to try to lose weight on her own, refuses weight loss surgery. medication, or dietician referral. Dementia is controlled with Rx, no side effects, asymptomatic.   - Patient complains of pruritus on her back x several months, intermittent, denies rash or pain. She hasn't tried any OTC Rx for  resolution of symptoms.   - Patient is without any other complaints today.     denies Urinary leakage.  denies Recent falls or balance difficulty.   admits to Daily exercise or physical activity.  denies Depression, stress, anxiety, or emotional lability.   admits to The need for healthcare treatment including a cane/walker, denies blood pressure monitoring, or denies regular vision/hearing tests.     A separate E/M code has been provided to evaluate additional complaints that the patient would like addressed during the dedicated Medicare Wellness Exam.    Patient Care Team:  Lotus Cunningham MD as PCP - General (Family Medicine)     Opioid Screening: Patient medication list reviewed, patient is not taking prescription opioids. Patient is not using additional opioids than prescribed. Patient is at low risk of substance abuse based on this opioid use history.      Advance Care Planning     Date: 06/28/2023  Patient did not wish or was not able to name a surrogate decision maker or provide an Advance Care Plan.        ----------------------------  CAD (coronary artery disease)  Chronic sinusitis, unspecified  Diabetic neuropathy  Essential (primary) hypertension  GERD (gastroesophageal reflux disease)  Hiatal hernia  Hypercholesteremia  Microalbuminuria  Obesity, unspecified  PAD (peripheral artery disease)  Peripheral edema  Seasonal allergic rhinitis  Type 2 diabetes mellitus without complications  Unspecified cataract  Unspecified dementia without behavioral disturbance  Vitamin D deficiency     Past Surgical History:   Procedure Laterality Date    CARDIAC CATHETERIZATION      COLONOSCOPY      HYSTERECTOMY         Review of patient's allergies indicates:   Allergen Reactions    Ciprofloxacin Swelling     Other reaction(s): Swelling    Sulfa (sulfonamide antibiotics)        Outpatient Medications Marked as Taking for the 6/28/23 encounter (Office Visit) with Lotus Cunningham MD   Medication Sig Dispense  Refill    aspirin (ECOTRIN) 81 MG EC tablet Take 81 mg by mouth once daily.      blood sugar diagnostic Strp 100 each by Other route once daily. USE TO CHECK FASTING CBG      blood-glucose meter kit 1 each by Other route once daily. USE TO CHECK CBG      donepeziL (ARICEPT) 10 MG tablet TAKE 1 TABLET BY MOUTH EVERYDAY AT BEDTIME 90 tablet 3    fluticasone propionate (FLONASE) 50 mcg/actuation nasal spray USE 2 SPRAYS IN EACH NOSTRIL DAILY AS NEEDED FOR ALLERGY SYMPTOMS 16 mL 11    furosemide (LASIX) 20 MG tablet Take 20 mg by mouth once daily.      gabapentin (NEURONTIN) 300 MG capsule TAKE 1 CAP TWICE DAILY AS NEEDED FOR NERVE PAIN 60 capsule 5    hydrALAZINE (APRESOLINE) 100 MG tablet TAKE 1 TABLET BY MOUTH THREE TIMES A  tablet 3    lancets Misc 100 each by Other route once daily. USE TO CHECK FASTING CBG      losartan (COZAAR) 100 MG tablet TAKE 1 TABLET BY MOUTH EVERY DAY 90 tablet 3    mupirocin (BACTROBAN) 2 % ointment Apply topically 2 (two) times daily. 15 g 0    pantoprazole (PROTONIX) 40 MG tablet Take 1 tablet by mouth once daily.      pioglitazone (ACTOS) 15 MG tablet TAKE 1 TABLET BY MOUTH EVERY DAY 90 tablet 3    prasugreL (EFFIENT) 10 mg Tab Take 10 mg by mouth once daily.      vitamin D (VITAMIN D3) 1000 units Tab Take 1 tablet by mouth once daily.      [DISCONTINUED] pravastatin (PRAVACHOL) 40 MG tablet TAKE 1 TABLET BY MOUTH EVERY DAY 90 tablet 3       Social History     Socioeconomic History    Marital status: Unknown   Tobacco Use    Smoking status: Never    Smokeless tobacco: Never   Substance and Sexual Activity    Alcohol use: Never    Drug use: Never    Sexual activity: Never        Family History   Problem Relation Age of Onset    Heart disease Mother     Heart disease Father         Subjective:     ROS      See HPI for details    Constitutional: No fever, No chills, No fatigue.   Eye: No blurring, No visual disturbances.   Ear/Nose/Mouth/Throat: No decreased hearing, No ear pain, No  nasal congestion, No sore throat.   Respiratory: No shortness of breath, No cough, No wheezing.   Cardiovascular: Peripheral edema, No chest pain, No palpitations.   Breast: Both breasts, No lump/ mass, No pain.   Nipple discharge: None.   Gastrointestinal: No nausea, No vomiting, No diarrhea, No constipation, No abdominal pain.   Genitourinary: No dysuria, No hematuria.   Gynecologic: Negative except as documented in history of present illness.   Hematology/Lymphatics: No bruising tendency, No bleeding tendency, No swollen lymph glands.   Endocrine: No excessive thirst, No polyuria, No excessive hunger.   Musculoskeletal: No joint pain, No muscle pain, No decreased range of motion.   Integumentary: No rash, Reports pruritus.   Neurologic: No abnormal balance, No confusion, No headache.   Psychiatric: No anxiety, No depression, Not suicidal, No hallucinations.     All Other ROS: Negative except as stated in HPI.       Objective:     /84 (BP Location: Right arm, Patient Position: Sitting, BP Method: Medium (Manual))   Pulse 96   Wt 122.5 kg (270 lb)   SpO2 97%   BMI 44.93 kg/m²     Physical Exam    General: Alert and oriented, No acute distress.   Appearance: Morbidly obese.   Eye: Pupils are equal, round and reactive to light, Extraocular movements are intact, Normal conjunctiva.   HENT: Normocephalic, Tympanic membranes are clear, Normal hearing, Oral mucosa is moist, No pharyngeal erythema.   Throat: Pharynx ( Not edematous, No exudate ).   Neck: Supple, Non-tender, No carotid bruit, No lymphadenopathy, No thyromegaly.   Respiratory: Lungs are clear to auscultation, Respirations are non-labored, Breath sounds are equal, Symmetrical chest wall expansion, No chest wall tenderness.   Cardiovascular: Normal rate, Regular rhythm, No murmur, Good pulses equal in all extremities.   Edema: Bilateral, Lower extremity, 1+, Pitting.   Gastrointestinal: Soft, Non-tender, Non-distended, Normal bowel sounds, No  organomegaly.   Genitourinary: No costovertebral angle tenderness.   Musculoskeletal: Normal range of motion, No tenderness, Normal gait.   Protective Sensation (w/ 10 gram monofilament):  Right: Intact  Left: Intact    Visual Inspection:  Normal -  Bilateral    Pedal Pulses:   Right: Present  Left: Present    Posterior Tibialis Pulses:   Right:Present  Left: Present   Integumentary:   Neurologic: No focal deficits, Cranial Nerves II-XII are grossly intact.   Psychiatric: Cooperative, Appropriate mood & affect, Normal judgment, Non-suicidal.   Mood and affect: Calm.   Behavior: Relaxed.     *Lab results from 06/12/2023 were reviewed and discussed with patient and patient voices understanding.*      Assessment:       ICD-10-CM ICD-9-CM   1. Medicare annual wellness visit, subsequent  Z00.00 V70.0   2. Postmenopausal  Z78.0 V49.81   3. Screening for osteoporosis  Z13.820 V82.81   4. Visit for screening mammogram  Z12.31 V76.12   5. Controlled type 2 diabetes mellitus with diabetic polyneuropathy, without long-term current use of insulin  E11.42 250.60     357.2   6. Hypertension associated with diabetes  E11.59 250.80    I15.2 401.9   7. Hyperlipidemia associated with type 2 diabetes mellitus  E11.69 250.80    E78.5 272.4   8. Morbid obesity  E66.01 278.01   9. Peripheral arterial occlusive disease  I77.9 444.22   10. Dementia, unspecified dementia severity, unspecified dementia type, unspecified whether behavioral, psychotic, or mood disturbance or anxiety  F03.90 294.20   11. Dermatitis  L30.9 692.9        Plan:       Medicare Annual Wellness and Personalized Prevention Plan:     Fall Risk + Home Safety + Hearing Impairment + Depression Screen + Cognitive Impairment Screen + Health Risk Assessment all reviewed.     No flowsheet data found.  Depression Screeening PHQ2 6/28/2023 3/23/2023   Over the last two weeks how often have you been bothered by little interest or pleasure in doing things 0 0   Over the last two  weeks how often have you been bothered by feeling down, depressed or hopeless 0 0   PHQ-2 Total Score 0 0     Fall Risk Assessment - Outpatient 6/28/2023 3/23/2023   Mobility Status Ambulatory Ambulatory w/ assistance   Number of falls 0 0   Identified as fall risk 0 1             What is your age?: 82 or older  Are you male or female?: Female  During the past four weeks, how much have you been bothered by emotional problems such as feeling anxious, depressed, irritable, sad, or downhearted and blue?: Not at all  During the past five weeks, has your physical and/or emotional health limited your social activities with family, friends, neighbors, or groups?: Not at all  During the past four weeks, how much bodily pain have you generally had?: Mild pain  During the past four weeks, was someone available to help if you needed and wanted help?: Yes, as much as I wanted  During the past four weeks, what was the hardest physical activity you could do for at least two minutes?: Light  Can you get to places out of walking distance without help?  (For example, can you travel alone on buses or taxis, or drive your own car?): Yes  Can you go shopping for groceries or clothes without someone's help?: Yes  Can you prepare your own meals?: Yes  Can you do your own housework without help?: Yes  Because of any health problems, do you need the help of another person with your personal care needs such as eating, bathing, dressing, or getting around the house?: No  Can you handle your own money without help?: Yes  During the past four weeks, how would you rate your health in general?: Fair  How have things been going for you during the past four weeks?: Very well  Are you having difficulties driving your car?: No  Do you always fasten your seat belt when you are in a car?: Yes, usually  How often in the past four weeks have you been bothered by falling or dizzy when standing up?: Never  How often in the past four weeks have you been  bothered by sexual problems?: Never  How often in the past four weeks have you been bothered by trouble eating well?: Never  How often in the past four weeks have you been bothered by teeth or denture problems?: Never  How often in the past four weeks have you been bothered with problems using the telephone?: Never  How often in the past four weeks have you been bothered by tiredness or fatigue?: Never  Have you fallen two or more times in the past year?: No  Are you afraid of falling?: Yes  Are you a smoker?: No  During the past four weeks, how many drinks of wine, beer, or other alcoholic beverages did you have?: No alcohol at all  Do you exercise for about 20 minutes three or more days a week?: No, I usually do not exercise this much  Have you been given any information to help you with hazards in your house that might hurt you?: Yes  Have you been given any information to help you with keeping track of your medications?: Yes  How often do you have trouble taking medicines the way you've been told to take them?: I always take them as prescribed  How confident are you that you can control and manage most of your health problems?: Very confident  What is your race? (Check all that apply.):                  Alcohol/Tobacco Use - Stressed importance of smoking cessation and limiting alcohol intake.  CVD Risk Factors - Reviewed  Obesity/Physical Activity - Normal BMI. Encouraged daily 30 minute physical activity x 5 days per week.      Health Maintenance Topics with due status: Not Due       Topic Last Completion Date    Diabetes Urine Screening 06/12/2023    Lipid Panel 06/12/2023    Hemoglobin A1c 06/12/2023        Vaccinations -   Immunization History   Administered Date(s) Administered    COVID-19, MRNA, LN-S, PF (MODERNA FULL 0.5 ML DOSE) 02/03/2021, 03/10/2021    Influenza - Trivalent - PF (ADULT) 10/08/2009, 10/05/2011, 10/19/2013, 10/14/2014, 12/11/2015, 11/02/2016, 10/18/2017, 10/20/2020,  11/08/2021    Pneumococcal Conjugate - 13 Valent 10/07/2019    Pneumococcal Polysaccharide - 23 Valent 01/15/2021          1. Medicare annual wellness visit, subsequent  - TSH; Future  - Will treat pending lab results. Monthly breast self exam encouraged. Diet, exercise, and 10% weight loss encouraged. Keep appointment for dental exams x q6 months as scheduled. Keep appointment for annual eye exam as scheduled. Keep appointment with specialists as scheduled. Notify M.D. or ER if temp greater than 100.4, or any acute illness.      2. Postmenopausal  - DXA Bone Density Axial Skeleton 1 or more sites; Future  - DXA Bone Density Axial Skeleton 1 or more sites    3. Screening for osteoporosis  - DXA Bone Density Axial Skeleton 1 or more sites; Future  - DXA Bone Density Axial Skeleton 1 or more sites    4. Visit for screening mammogram  - Mammo Digital Screening Bilat w/ Kenn; Future  - Mammo Digital Screening Bilat w/ Kenn    5. Controlled type 2 diabetes mellitus with diabetic polyneuropathy, without long-term current use of insulin  - tirzepatide 2.5 mg/0.5 mL PnIj; Inject 2.5 mg into the skin every 7 days.  Dispense: 4 pen; Refill: 1  - Urinalysis, Reflex to Urine Culture; Future  - Microalbumin/Creatinine Ratio, Urine; Future  - Ambulatory referral/consult to Ophthalmology; Future  - Discontinue Jardiance per patient request. Diet. exercise, and 10% weight loss encouraged. Rx trial of Mounjaro with close monitoring to help with diabetes control and obesity. Will titrate Rx Mounjaro as needed/tolerated until max dose achieved. Continue remaining DM II Rx as prescribed. Recheck CMP, HgA1C in 09/2023 due to Rx change. Notify M.D. or ER if symptoms persist or worsen, adverse Rx side effects, temp greater than 100.4, or any acute illness.    Lab Results   Component Value Date    HGBA1C 5.6 06/12/2023      Continue   On ARB and Statin according to guidelines.  Follow ADA Diet. Avoid soda, simple sweets, and limit  rice/pasta/breads/starches.  Maintain healthy weight with goal BMI <30.  Exercise 5 times per week for 30 minutes per day.  Stressed importance of daily foot exams.  Stressed importance of annual dilated eye exam.     6. Hypertension associated with diabetes  - CBC Auto Differential; Future  - BP is well controlled. Continue BP Rx as prescribed. Continue followup with Cardiology as scheduled. Keep daily BP log. Notify M.D. or ER if BP >170/100 or <90/60, chest pain, palpitations, headache, SOB, temp greater than 100.4, or any acute illness.   Continue  Low Sodium Diet (DASH Diet - Less than 2 grams of sodium per day).  Monitor blood pressure daily and log. Report consistent numbers greater than 140/90.  Smoking cessation encouraged to aid in BP reduction.  Maintain healthy weight with goal BMI <30. Exercise 30 minutes per day, 5 days per week.      7. Hyperlipidemia associated with type 2 diabetes mellitus  - pravastatin (PRAVACHOL) 80 MG tablet; Take 1 tablet (80 mg total) by mouth every evening.  Dispense: 90 tablet; Refill: 3  - Cholesterol is not controlled. Rx for increased dose of Pravastatin sent. Recheck CMP, FLP, CPK in 09/2023. Notify M.D. or ER if symptoms persist or worsen, fatigue or spasms from statin, temp >100.4, or any acute illness.      8. Morbid obesity  Body mass index is 44.93 kg/m².  Goal BMI <30.  Exercise 5 times a week for 30 minutes per day.  Avoid soda, simple sugars, excessive rice, potatoes or bread. Limit fast foods and fried foods.  Choose complex carbs in moderation (example: green vegetables, beans, oatmeal). Eat plenty of fresh fruits and vegetables with lean meats daily.  Do not skip meals. Eat a balanced portion size.  Avoid fad diets. Consider permanent healthy life style changes.      9. Peripheral arterial occlusive disease  - Asymptomatic, continue current treatment plan, continue followup with Vascular as scheduled.     10. Dementia, unspecified dementia severity,  unspecified dementia type, unspecified whether behavioral, psychotic, or mood disturbance or anxiety  - Asymptomatic, continue current treatment plan.     11. Dermatitis  - Rx trial of betamethasone valerate 0.1% (VALISONE) 0.1 % Crea; Apply topically 2 (two) times daily as needed (rash).  Dispense: 45 g; Refill: 1; keep skin moisturized, will proceed with referral to Dermatology if no improvement. Notify M.D. or ER if symptoms persist or worsen, temp >100.4, or any acute illness.          Medication List with Changes/Refills   New Medications    BETAMETHASONE VALERATE 0.1% (VALISONE) 0.1 % CREA    Apply topically 2 (two) times daily as needed (rash).       Start Date: 6/28/2023 End Date: --    PRAVASTATIN (PRAVACHOL) 80 MG TABLET    Take 1 tablet (80 mg total) by mouth every evening.       Start Date: 6/28/2023 End Date: 6/27/2024    TIRZEPATIDE 2.5 MG/0.5 ML PNIJ    Inject 2.5 mg into the skin every 7 days.       Start Date: 6/28/2023 End Date: 8/27/2023   Current Medications    AMLODIPINE (NORVASC) 5 MG TABLET    Take 1 tablet by mouth once daily.       Start Date: 7/27/2021 End Date: --    ASPIRIN (ECOTRIN) 81 MG EC TABLET    Take 81 mg by mouth once daily.       Start Date: --        End Date: --    BLOOD SUGAR DIAGNOSTIC STRP    100 each by Other route once daily. USE TO CHECK FASTING CBG       Start Date: 10/11/2021End Date: --    BLOOD-GLUCOSE METER KIT    1 each by Other route once daily. USE TO CHECK CBG       Start Date: 10/11/2021End Date: --    DICLOFENAC (VOLTAREN) 75 MG EC TABLET    Take 75 mg by mouth 2 (two) times daily as needed.       Start Date: 12/15/2021End Date: --    DONEPEZIL (ARICEPT) 10 MG TABLET    TAKE 1 TABLET BY MOUTH EVERYDAY AT BEDTIME       Start Date: 6/21/2023 End Date: --    FLUTICASONE PROPIONATE (FLONASE) 50 MCG/ACTUATION NASAL SPRAY    USE 2 SPRAYS IN EACH NOSTRIL DAILY AS NEEDED FOR ALLERGY SYMPTOMS       Start Date: 12/5/2022 End Date: --    FUROSEMIDE (LASIX) 20 MG TABLET     Take 20 mg by mouth once daily.       Start Date: --        End Date: --    GABAPENTIN (NEURONTIN) 300 MG CAPSULE    TAKE 1 CAP TWICE DAILY AS NEEDED FOR NERVE PAIN       Start Date: 12/5/2022 End Date: --    HYDRALAZINE (APRESOLINE) 100 MG TABLET    TAKE 1 TABLET BY MOUTH THREE TIMES A DAY       Start Date: 4/5/2023  End Date: --    LANCETS MISC    100 each by Other route once daily. USE TO CHECK FASTING CBG       Start Date: 10/11/2021End Date: --    LOSARTAN (COZAAR) 100 MG TABLET    TAKE 1 TABLET BY MOUTH EVERY DAY       Start Date: 12/5/2022 End Date: --    MUPIROCIN (BACTROBAN) 2 % OINTMENT    Apply topically 2 (two) times daily.       Start Date: 4/27/2023 End Date: --    PANTOPRAZOLE (PROTONIX) 40 MG TABLET    Take 1 tablet by mouth once daily.       Start Date: 3/9/2022  End Date: --    PIOGLITAZONE (ACTOS) 15 MG TABLET    TAKE 1 TABLET BY MOUTH EVERY DAY       Start Date: 8/9/2022  End Date: --    PRASUGREL (EFFIENT) 10 MG TAB    Take 10 mg by mouth once daily.       Start Date: --        End Date: --    VITAMIN D (VITAMIN D3) 1000 UNITS TAB    Take 1 tablet by mouth once daily.       Start Date: --        End Date: --   Discontinued Medications    JARDIANCE 10 MG TABLET    Take 10 mg by mouth once daily.       Start Date: 4/5/2022  End Date: 6/28/2023    PRAVASTATIN (PRAVACHOL) 40 MG TABLET    TAKE 1 TABLET BY MOUTH EVERY DAY       Start Date: 4/3/2023  End Date: 6/28/2023          The patient's Health Maintenance was reviewed and the following appears to be due at this time:   Health Maintenance Due   Topic Date Due    Eye Exam  Never done    DEXA Scan  Never done         Follow up in about 4 weeks (around 7/26/2023) for Diabetes Followup- 30 minute appointment.

## 2023-07-05 ENCOUNTER — DOCUMENTATION ONLY (OUTPATIENT)
Dept: FAMILY MEDICINE | Facility: CLINIC | Age: 83
End: 2023-07-05
Payer: MEDICARE

## 2023-07-05 ENCOUNTER — TELEPHONE (OUTPATIENT)
Dept: FAMILY MEDICINE | Facility: CLINIC | Age: 83
End: 2023-07-05
Payer: MEDICARE

## 2023-07-05 NOTE — TELEPHONE ENCOUNTER
----- Message from Tori Churchill sent at 7/5/2023  9:44 AM CDT -----  Regarding: med education  .Type:  Needs Medical Advice    Who Called: pt  Symptoms (please be specific):    How long has patient had these symptoms:    Pharmacy name and phone #:    Would the patient rather a call back or a response via [facility]?   Best Call Back Number: [phone]  Additional Information:  - Pt is aking if she needs to take mounjaro and pioglitazone (ACTOS) 15 MG tablet   Please call to Inform

## 2023-07-07 DIAGNOSIS — D70.9 NEUTROPENIA, UNSPECIFIED TYPE: Primary | ICD-10-CM

## 2023-07-07 LAB
ALBUMIN/CREAT UR: 18 MG/G CREAT (ref 0–29)
APPEARANCE UR: CLEAR
BACTERIA #/AREA URNS HPF: ABNORMAL /[HPF]
BACTERIA UR CULT: NORMAL
BACTERIA UR CULT: NORMAL
BASOPHILS # BLD AUTO: 0 X10E3/UL (ref 0–0.2)
BASOPHILS NFR BLD AUTO: 1 %
BILIRUB UR QL STRIP: NEGATIVE
COLOR UR: YELLOW
CREAT UR-MCNC: 104.9 MG/DL
CRYSTALS URNS MICRO: ABNORMAL
EOSINOPHIL # BLD AUTO: 0 X10E3/UL (ref 0–0.4)
EOSINOPHIL NFR BLD AUTO: 1 %
EPI CELLS #/AREA URNS HPF: ABNORMAL /HPF (ref 0–10)
ERYTHROCYTE [DISTWIDTH] IN BLOOD BY AUTOMATED COUNT: 13.6 % (ref 11.7–15.4)
GLUCOSE UR QL STRIP: NEGATIVE
HCT VFR BLD AUTO: 38.7 % (ref 34–46.6)
HGB BLD-MCNC: 12.3 G/DL (ref 11.1–15.9)
HGB UR QL STRIP: NEGATIVE
KETONES UR QL STRIP: NEGATIVE
LEUKOCYTE ESTERASE UR QL STRIP: ABNORMAL
LYMPHOCYTES # BLD AUTO: 1.5 X10E3/UL (ref 0.7–3.1)
LYMPHOCYTES NFR BLD AUTO: 46 %
MCH RBC QN AUTO: 30.3 PG (ref 26.6–33)
MCHC RBC AUTO-ENTMCNC: 31.8 G/DL (ref 31.5–35.7)
MCV RBC AUTO: 95 FL (ref 79–97)
MICRO URNS: ABNORMAL
MICROALBUMIN UR-MCNC: 19 UG/ML
MONOCYTES # BLD AUTO: 0.5 X10E3/UL (ref 0.1–0.9)
MONOCYTES NFR BLD AUTO: 14 %
MUCOUS THREADS URNS QL MICRO: PRESENT
NEUTROPHILS # BLD AUTO: 1.3 X10E3/UL (ref 1.4–7)
NEUTROPHILS NFR BLD AUTO: 38 %
NITRITE UR QL STRIP: NEGATIVE
PH UR STRIP: 6 [PH] (ref 5–7.5)
PLATELET # BLD AUTO: 312 X10E3/UL (ref 150–450)
PROT UR QL STRIP: ABNORMAL
RBC # BLD AUTO: 4.06 X10E6/UL (ref 3.77–5.28)
RBC #/AREA URNS HPF: ABNORMAL /HPF (ref 0–2)
SP GR UR STRIP: 1.02 (ref 1–1.03)
TSH SERPL DL<=0.005 MIU/L-ACNC: 3.46 UIU/ML (ref 0.45–4.5)
URINALYSIS REFLEX: ABNORMAL
UROBILINOGEN UR STRIP-MCNC: 0.2 MG/DL (ref 0.2–1)
WBC # BLD AUTO: 3.3 X10E3/UL (ref 3.4–10.8)
WBC #/AREA URNS HPF: ABNORMAL /HPF (ref 0–5)

## 2023-07-07 RX ORDER — PIOGLITAZONEHYDROCHLORIDE 15 MG/1
TABLET ORAL
Qty: 90 TABLET | Refills: 3 | Status: SHIPPED | OUTPATIENT
Start: 2023-07-07

## 2023-07-10 ENCOUNTER — TELEPHONE (OUTPATIENT)
Dept: FAMILY MEDICINE | Facility: CLINIC | Age: 83
End: 2023-07-10
Payer: MEDICARE

## 2023-07-10 NOTE — TELEPHONE ENCOUNTER
----- Message from Lotus Cunningham MD sent at 7/7/2023  1:07 PM CDT -----  WBC count is mildly low, 3.3, NL: 4.5-11.5, could be due to genetics, recent infection, auto-immune disease, medications, vitamin deficiency, or incidental, needs to repeat CBC with diff in 10/2023 for further recommendations, if still low, will proceed with Vitamin B12, folate, copper level, auto-immune disease workup and Hematology referral. There is no evidence of protein in urine from diabetes, recheck urine microalbumin level in 07/2024. Remaining labs are essentially normal.

## 2023-07-11 ENCOUNTER — TELEPHONE (OUTPATIENT)
Dept: FAMILY MEDICINE | Facility: CLINIC | Age: 83
End: 2023-07-11
Payer: MEDICARE

## 2023-07-11 NOTE — TELEPHONE ENCOUNTER
----- Message from Shiva Cabrera sent at 7/11/2023 10:55 AM CDT -----  .Type:  Needs Medical Advice    Who Called: Desire  Symptoms (please be specific):    How long has patient had these symptoms:    Pharmacy name and phone #:    Would the patient rather a call back or a response via [facility]?   Best Call Back Number: [phone]  Additional Information: She was returning a call from the office she missed. Something about her vitamins.

## 2023-08-18 DIAGNOSIS — E11.42 CONTROLLED TYPE 2 DIABETES MELLITUS WITH DIABETIC POLYNEUROPATHY, WITHOUT LONG-TERM CURRENT USE OF INSULIN: ICD-10-CM

## 2023-08-18 RX ORDER — TIRZEPATIDE 2.5 MG/.5ML
INJECTION, SOLUTION SUBCUTANEOUS
Qty: 4 PEN | Refills: 1 | Status: SHIPPED | OUTPATIENT
Start: 2023-08-18 | End: 2023-10-24

## 2023-10-24 DIAGNOSIS — E11.42 CONTROLLED TYPE 2 DIABETES MELLITUS WITH DIABETIC POLYNEUROPATHY, WITHOUT LONG-TERM CURRENT USE OF INSULIN: ICD-10-CM

## 2023-10-24 RX ORDER — TIRZEPATIDE 2.5 MG/.5ML
INJECTION, SOLUTION SUBCUTANEOUS
Qty: 4 PEN | Refills: 1 | Status: SHIPPED | OUTPATIENT
Start: 2023-10-24 | End: 2023-12-07

## 2023-11-13 ENCOUNTER — TELEPHONE (OUTPATIENT)
Dept: FAMILY MEDICINE | Facility: CLINIC | Age: 83
End: 2023-11-13
Payer: MEDICARE

## 2023-11-13 NOTE — TELEPHONE ENCOUNTER
----- Message from Lotus Cunningham MD sent at 11/13/2023 12:05 PM CST -----  Regarding: RE: meds  She was due for a 4 week followup appointment in 07/2023, but I'm not sure what happened with that appointment. If she is tolerating Mounjaro without side effects, she can hold the Actos for now and continue to Mounjaro, but she will need an appointment in 12/2023 for followup and to repeat her DM II labs. Thanks.   ----- Message -----  From: Jayna Beckman LPN  Sent: 11/13/2023  11:45 AM CST  To: Lotus Cunningham MD  Subject: meds                                             Pt asked if she needs to continue taking Actos, if she is using mounjaro. Please advise Thanks  ----- Message -----  From: Rickey Lugo  Sent: 11/13/2023   8:55 AM CST  To: [facility] Staff    .Type:  Needs Medical Advice    Who Called: pt  Symptoms (please be specific):    How long has patient had these symptoms:    Pharmacy name and phone #:    Would the patient rather a call back or a response via [facility]? Call back  Best Call Back Number: [phone]  Additional Information: pt calling to discuss current medications

## 2023-12-07 DIAGNOSIS — E11.42 CONTROLLED TYPE 2 DIABETES MELLITUS WITH DIABETIC POLYNEUROPATHY, WITHOUT LONG-TERM CURRENT USE OF INSULIN: ICD-10-CM

## 2023-12-07 DIAGNOSIS — I10 ESSENTIAL (PRIMARY) HYPERTENSION: ICD-10-CM

## 2023-12-07 RX ORDER — LOSARTAN POTASSIUM 100 MG/1
TABLET ORAL
Qty: 90 TABLET | Refills: 3 | Status: SHIPPED | OUTPATIENT
Start: 2023-12-07

## 2023-12-07 RX ORDER — TIRZEPATIDE 2.5 MG/.5ML
INJECTION, SOLUTION SUBCUTANEOUS
Qty: 4 PEN | Refills: 1 | Status: SHIPPED | OUTPATIENT
Start: 2023-12-07

## 2023-12-12 ENCOUNTER — TELEPHONE (OUTPATIENT)
Dept: FAMILY MEDICINE | Facility: CLINIC | Age: 83
End: 2023-12-12
Payer: MEDICARE

## 2023-12-12 NOTE — TELEPHONE ENCOUNTER
----- Message from Lotus Cunningham MD sent at 12/11/2023  5:52 PM CST -----  Bone density scan is normal, routine bone density scan in 12/2025.

## 2024-02-27 ENCOUNTER — TELEPHONE (OUTPATIENT)
Dept: FAMILY MEDICINE | Facility: CLINIC | Age: 84
End: 2024-02-27
Payer: MEDICARE

## 2024-02-27 NOTE — TELEPHONE ENCOUNTER
----- Message from Radha Callaway sent at 2/27/2024  9:30 AM CST -----  Regarding: medical advice  Type:  Needs Medical Advice    Who Called: Desire    Pharmacy name and phone #:  [facility] [address]  Would the patient rather a call back or a response via [facility]? Call back   Best Call Back Number: [phone]  Additional Information: Desire is requesting something other than gabertin. She would like a call back

## 2024-02-28 ENCOUNTER — TELEPHONE (OUTPATIENT)
Dept: FAMILY MEDICINE | Facility: CLINIC | Age: 84
End: 2024-02-28
Payer: MEDICARE

## 2024-02-28 NOTE — TELEPHONE ENCOUNTER
----- Message from Rickey Lugo sent at 2/28/2024  2:04 PM CST -----  .Type:  Patient Returning Call    Who Called:pt  Who Left Message for Patient:Jayna Beckman LPN  Does the patient know what this is regarding?:medication  Would the patient rather a call back or a response via [facility]? Call back  Best Call Back Number:[phone]  Additional Information:

## 2024-04-11 DIAGNOSIS — E11.42 CONTROLLED TYPE 2 DIABETES MELLITUS WITH DIABETIC POLYNEUROPATHY, WITHOUT LONG-TERM CURRENT USE OF INSULIN: ICD-10-CM

## 2024-04-11 RX ORDER — TIRZEPATIDE 2.5 MG/.5ML
INJECTION, SOLUTION SUBCUTANEOUS
Qty: 4 PEN | Refills: 3 | Status: SHIPPED | OUTPATIENT
Start: 2024-04-11

## 2024-04-22 ENCOUNTER — TELEPHONE (OUTPATIENT)
Dept: FAMILY MEDICINE | Facility: CLINIC | Age: 84
End: 2024-04-22
Payer: MEDICARE

## 2024-04-22 NOTE — TELEPHONE ENCOUNTER
----- Message from Shiva Cabrera sent at 4/22/2024 10:46 AM CDT -----  .Type:  Needs Medical Advice    Who Called: Desire  Symptoms (please be specific):    How long has patient had these symptoms:    Pharmacy name and phone #:    Would the patient rather a call back or a response via [facility]?   Best Call Back Number: [phone]  Additional Information: Requested to speak with the nurse re: medication she would like to discuss with the doctor.  Maybe three or four weeks ago and no one  had called her back.

## 2024-04-22 NOTE — TELEPHONE ENCOUNTER
Pt would like something else prescribed for her nerve pain. Pt stopped taking gabapentin because of the side affects, she wants something with less side affects.

## 2024-04-22 NOTE — TELEPHONE ENCOUNTER
----- Message from Vee Camejo sent at 4/22/2024 11:07 AM CDT -----  Regarding: returned call  Type:  Patient Returning Call    Who Called:pt  Who Left Message for Patient:linda  Does the patient know what this is regarding?:[facility]    Best Call Back Number:[phone]  Additional Information:

## 2024-04-25 DIAGNOSIS — I10 HYPERTENSION, UNSPECIFIED TYPE: ICD-10-CM

## 2024-04-25 RX ORDER — HYDRALAZINE HYDROCHLORIDE 100 MG/1
TABLET, FILM COATED ORAL
Qty: 270 TABLET | Refills: 3 | Status: SHIPPED | OUTPATIENT
Start: 2024-04-25

## 2024-05-03 DIAGNOSIS — I15.2 HYPERTENSION ASSOCIATED WITH DIABETES: Primary | ICD-10-CM

## 2024-05-03 DIAGNOSIS — E11.59 HYPERTENSION ASSOCIATED WITH DIABETES: Primary | ICD-10-CM

## 2024-05-03 RX ORDER — FUROSEMIDE 20 MG/1
20 TABLET ORAL DAILY
Qty: 90 TABLET | Refills: 0 | Status: SHIPPED | OUTPATIENT
Start: 2024-05-03

## 2024-05-03 NOTE — TELEPHONE ENCOUNTER
----- Message from Shiva Cabrera sent at 5/3/2024  9:51 AM CDT -----  .Type:  RX Refill Request    Who Called: Desire  Refill or New Rx: Refill   RX Name and Strength: furosemide (LASIX) 20 MG tablet  How is the patient currently taking it? (ex. 1XDay):  Is this a 30 day or 90 day RX:  Preferred Pharmacy with phone number: [facility]  in [place]  Local or Mail Order: Local   Ordering Provider: Jael  Would the patient rather a call back or a response via [facility]?   Best Call Back Number:[phone]  Additional Information: Please call with any questions.

## 2024-07-08 RX ORDER — FLUTICASONE PROPIONATE 50 MCG
SPRAY, SUSPENSION (ML) NASAL
Qty: 48 ML | Refills: 3 | Status: SHIPPED | OUTPATIENT
Start: 2024-07-08

## 2024-07-15 ENCOUNTER — OFFICE VISIT (OUTPATIENT)
Dept: FAMILY MEDICINE | Facility: CLINIC | Age: 84
End: 2024-07-15
Payer: MEDICARE

## 2024-07-15 VITALS
BODY MASS INDEX: 45.42 KG/M2 | OXYGEN SATURATION: 98 % | WEIGHT: 272.63 LBS | HEART RATE: 67 BPM | DIASTOLIC BLOOD PRESSURE: 79 MMHG | HEIGHT: 65 IN | SYSTOLIC BLOOD PRESSURE: 161 MMHG

## 2024-07-15 DIAGNOSIS — I25.10 CORONARY ARTERY DISEASE WITHOUT ANGINA PECTORIS, UNSPECIFIED VESSEL OR LESION TYPE, UNSPECIFIED WHETHER NATIVE OR TRANSPLANTED HEART: ICD-10-CM

## 2024-07-15 DIAGNOSIS — F03.90 DEMENTIA, UNSPECIFIED DEMENTIA SEVERITY, UNSPECIFIED DEMENTIA TYPE, UNSPECIFIED WHETHER BEHAVIORAL, PSYCHOTIC, OR MOOD DISTURBANCE OR ANXIETY: Primary | ICD-10-CM

## 2024-07-15 DIAGNOSIS — I77.9 PERIPHERAL ARTERIAL OCCLUSIVE DISEASE: ICD-10-CM

## 2024-07-15 DIAGNOSIS — I10 ESSENTIAL (PRIMARY) HYPERTENSION: Chronic | ICD-10-CM

## 2024-07-15 DIAGNOSIS — K21.9 GASTROESOPHAGEAL REFLUX DISEASE, UNSPECIFIED WHETHER ESOPHAGITIS PRESENT: ICD-10-CM

## 2024-07-15 PROCEDURE — 1126F AMNT PAIN NOTED NONE PRSNT: CPT | Mod: CPTII,,,

## 2024-07-15 PROCEDURE — 1101F PT FALLS ASSESS-DOCD LE1/YR: CPT | Mod: CPTII,,,

## 2024-07-15 PROCEDURE — 3078F DIAST BP <80 MM HG: CPT | Mod: CPTII,,,

## 2024-07-15 PROCEDURE — 3288F FALL RISK ASSESSMENT DOCD: CPT | Mod: CPTII,,,

## 2024-07-15 PROCEDURE — G2211 COMPLEX E/M VISIT ADD ON: HCPCS | Mod: ,,,

## 2024-07-15 PROCEDURE — 3077F SYST BP >= 140 MM HG: CPT | Mod: CPTII,,,

## 2024-07-15 PROCEDURE — 99214 OFFICE O/P EST MOD 30 MIN: CPT | Mod: ,,,

## 2024-07-15 PROCEDURE — 1159F MED LIST DOCD IN RCRD: CPT | Mod: CPTII,,,

## 2024-07-15 RX ORDER — DONEPEZIL HYDROCHLORIDE 10 MG/1
10 TABLET, FILM COATED ORAL DAILY
Qty: 90 TABLET | Refills: 3 | Status: SHIPPED | OUTPATIENT
Start: 2024-07-15

## 2024-07-15 RX ORDER — PANTOPRAZOLE SODIUM 40 MG/1
40 TABLET, DELAYED RELEASE ORAL DAILY
Qty: 90 TABLET | Refills: 3 | Status: SHIPPED | OUTPATIENT
Start: 2024-07-15

## 2024-07-15 NOTE — PROGRESS NOTES
Patient ID: [id number]     Chief Complaint: Medication Refill    HPI:     Desire Connors is a 83 y.o. female here today for medication refill.    CAD/PAD is stable/asymptomatic. She follows up regularly with Cardiologist, Dr Adam. She is scheduled for evaluation of carotid artery disease next month. BP elevated at appointment states that she did not take her BP medication this morning. She does have Rxs at home. She denies headaches, weakness, dizziness, chest pain, or shortness of breath. She does not have a BP cuff at home. She agrees to purchase a BP machine soon. Discussed nurse visit for BP check. States she has difficulty with transportation and could not schedule an appointment without speaking with her transportation.  She admits to memory issues which consist of misplacing items and sometimes recall. She denies agitation or anxiety. She takes donepeziL which she states is helpful and she denies medication side effects.  GERD is controlled with current Rx. She denies medication side effects. She is requesting a medication refill.     Past Medical History:   Diagnosis Date    CAD (coronary artery disease)     Chronic sinusitis, unspecified     Diabetic neuropathy     Essential (primary) hypertension     GERD (gastroesophageal reflux disease)     Hiatal hernia     Hypercholesteremia     Microalbuminuria     Obesity, unspecified     PAD (peripheral artery disease)     Peripheral edema     Seasonal allergic rhinitis     Type 2 diabetes mellitus without complications     Unspecified cataract     Unspecified dementia without behavioral disturbance     Vitamin D deficiency         Past Surgical History:   Procedure Laterality Date    CARDIAC CATHETERIZATION      COLONOSCOPY      HYSTERECTOMY          Social History     Tobacco Use    Smoking status: Never    Smokeless tobacco: Never   Substance and Sexual Activity    Alcohol use: Never    Drug use: Never    Sexual activity: Never        Current Outpatient  Medications   Medication Instructions    amLODIPine (NORVASC) 5 MG tablet 1 tablet, Daily    aspirin (ECOTRIN) 81 mg, Oral, Daily    betamethasone valerate 0.1% (VALISONE) 0.1 % Crea Topical (Top), 2 times daily PRN    blood sugar diagnostic Strp 100 each, Other, Daily, USE TO CHECK FASTING CBG    blood-glucose meter kit 1 each, Other, Daily, USE TO CHECK CBG     diclofenac (VOLTAREN) 75 mg, 2 times daily PRN    donepeziL (ARICEPT) 10 mg, Oral, Daily    fluticasone propionate (FLONASE) 50 mcg/actuation nasal spray USE 2 SPRAYS IN EACH NOSTRIL DAILY AS NEEDED FOR ALLERGY SYMPTOMS    furosemide (LASIX) 20 mg, Oral, Daily    gabapentin (NEURONTIN) 300 MG capsule TAKE 1 CAP TWICE DAILY AS NEEDED FOR NERVE PAIN    hydrALAZINE (APRESOLINE) 100 MG tablet TAKE 1 TABLET BY MOUTH THREE TIMES A DAY    lancets Misc 100 each, Other, Daily, USE TO CHECK FASTING CBG    losartan (COZAAR) 100 MG tablet TAKE 1 TABLET BY MOUTH EVERY DAY    mupirocin (BACTROBAN) 2 % ointment Topical (Top), 2 times daily    pantoprazole (PROTONIX) 40 mg, Oral, Daily    pioglitazone (ACTOS) 15 MG tablet TAKE 1 TABLET BY MOUTH EVERY DAY    prasugreL (EFFIENT) 10 mg, Daily    pravastatin (PRAVACHOL) 80 mg, Oral, Nightly    tirzepatide (MOUNJARO) 2.5 mg/0.5 mL PnIj INJECT 2.5 MG SUBCUTANEOUSLY EVERY 7 DAYS    vitamin D (VITAMIN D3) 1000 units Tab 1 tablet, Oral, Daily       Review of patient's allergies indicates:   Allergen Reactions    Ciprofloxacin Swelling     Other reaction(s): Swelling    Sulfa (sulfonamide antibiotics)         Patient Care Team:  Lotus Cunningham MD as PCP - General (Family Medicine)     Subjective:     Review of Systems    Constitutional: Denies Change in appetite. Denies Chills. Denies Fever. Denies Night sweats.  Eye: Denies Blurred vision. Denies Discharge. Denies Eye pain.  ENT: Denies Decreased hearing. Denies Sore throat. Denies Swollen glands.  Respiratory: Denies Cough. Denies Shortness of breath. Denies Shortness of  "breath with exertion. Denies Wheezing.  Cardiovascular: Denies Chest pain at rest. Denies Chest pain with exertion. Denies Irregular heartbeat. Denies Palpitations. Dependent  edema bilateral lower extremities.  Gastrointestinal: Denies Abdominal pain. Denies Diarrhea. Denies Nausea. Denies Vomiting.   Genitourinary: Denies Dysuria. Denies Urinary frequency. Denies Urinary urgency. Denies Blood in urine.  Endocrine: Denies Cold intolerance. Denies Excessive thirst. Denies Heat intolerance. Denies Weight loss. Denies Weight gain.  Musculoskeletal: Reports occasional aches and pain however Denies Painful joints. Denies Weakness.  Integumentary: Denies Rash. Denies Itching. Denies Dry skin.  Neurologic: Denies Dizziness. Denies Fainting. Denies Headache.  Psychiatric: As per HPI. Denies Depression. Denies Anxiety. Denies Suicidal/Homicidal ideations.     12 point review of systems conducted, negative except as stated in the history of present illness. See HPI for details.    Objective:     Visit Vitals  BP (!) 161/79 (BP Location: Left forearm, Patient Position: Sitting, BP Method: Medium (Automatic))   Pulse 67   Ht 5' 5" (1.651 m)   Wt 123.7 kg (272 lb 9.6 oz)   SpO2 98%   BMI 45.36 kg/m²     BP recheck 155/77    Physical Exam     General: Alert and oriented, No acute distress.   Eye: Pupils are equal, round and reactive to light, Normal conjunctiva.   HENT: Normocephalic, Normal hearing, Oral mucosa is moist, No pharyngeal erythema.   Throat: Pharynx not edematous, no exudate.   Neck: Supple, Non-tender, No lymphadenopathy.   Respiratory: Lungs are clear to auscultation, Respirations are non-labored, Breath sounds are equal, Symmetrical chest wall expansion, No chest wall tenderness.   Cardiovascular: Normal rate, Regular rhythm, No murmur, Good pulses equal in all extremities, No edema.   Gastrointestinal: Soft, Non-tender, Non-distended, Normal bowel sounds, No organomegaly.   Genitourinary: No costovertebral " angle tenderness.   Musculoskeletal: Normal range of motion, Normal gait.   Neurologic: No focal deficits, Cranial Nerves II-XII are grossly intact.   Psychiatric: Cooperative, Appropriate mood & affect, Normal judgment, No-suicidal/No homicidal ideations  Mood and affect: Calm.   Behavior: Relaxed.     Assessment:       ICD-10-CM ICD-9-CM   1. Dementia, unspecified dementia severity, unspecified dementia type, unspecified whether behavioral, psychotic, or mood disturbance or anxiety  F03.90 294.20   2. Gastroesophageal reflux disease, unspecified whether esophagitis present  K21.9 530.81   3. Essential (primary) hypertension  I10 401.9   4. Coronary artery disease without angina pectoris, unspecified vessel or lesion type, unspecified whether native or transplanted heart  I25.10 414.00   5. Peripheral arterial occlusive disease  I77.9 444.22        Plan:     1. Dementia, unspecified dementia severity, unspecified dementia type, unspecified whether behavioral, psychotic, or mood disturbance or anxiety  Assessment & Plan:  Asymptomatic, continue donepezil as prescribed.    Orders:  -     donepeziL (ARICEPT) 10 MG tablet; Take 1 tablet (10 mg total) by mouth once daily.  Dispense: 90 tablet; Refill: 3    2. Gastroesophageal reflux disease, unspecified whether esophagitis present  Assessment & Plan:  Continue pantoprazole as prescribed.  Avoid spicy, acidic, fried foods and alcohol.  Avoid Eat 2-3 hours before going to bed.  Chew food thoroughly and reduce caffeine intake, avoid soda.    Orders:  -     pantoprazole (PROTONIX) 40 MG tablet; Take 1 tablet (40 mg total) by mouth once daily.  Dispense: 90 tablet; Refill: 3    3. Essential (primary) hypertension  Assessment & Plan:  Stressed importance of taking BP medications daily. Continue losartan, hydralazine and amlodipine as prescribed. Will titrate BP Rx until BP is <138/89.   Follow a low sodium diet (DASH Diet - Less than 2 grams of sodium per day).  Agrees to  purchase a BP cuff for home monitoring of BP.  Go to ED if BP >170/100 or <90/60, chest pain, palpitations,dizziness, headache, SOB, temp greater than 100.4, or any acute illness.       Monitor blood pressure daily and log. Report consistent numbers greater than 140/90.  Smoking cessation encouraged to aid in BP reduction.  Maintain healthy weight with goal BMI <30. Exercise 30 minutes per day, 5 days per week.         4. Coronary artery disease without angina pectoris, unspecified vessel or lesion type, unspecified whether native or transplanted heart  Assessment & Plan:  Asymptomatic. Continue current treatment plan of pravastatin and ASA.  Keep follow up appointment with Cardiologist.       5. Peripheral arterial occlusive disease  Assessment & Plan:  Same as #4.           Schedule annual wellness August/September 2024 as transportation permits. In addition to their scheduled follow up, the patient has also been instructed to follow up on as needed basis.     Future Appointments   Date Time Provider Department Center   11/13/2024  1:30 PM Lotus Cunningham MD [facility]

## 2024-07-18 NOTE — ASSESSMENT & PLAN NOTE
Stressed importance of taking BP medications daily. Continue losartan, hydralazine and amlodipine as prescribed. Will titrate BP Rx until BP is <138/89.   Follow a low sodium diet (DASH Diet - Less than 2 grams of sodium per day).  Agrees to purchase a BP cuff for home monitoring of BP.  Go to ED if BP >170/100 or <90/60, chest pain, palpitations,dizziness, headache, SOB, temp greater than 100.4, or any acute illness.       Monitor blood pressure daily and log. Report consistent numbers greater than 140/90.  Smoking cessation encouraged to aid in BP reduction.  Maintain healthy weight with goal BMI <30. Exercise 30 minutes per day, 5 days per week.

## 2024-07-18 NOTE — ASSESSMENT & PLAN NOTE
Asymptomatic. Continue current treatment plan of pravastatin and ASA.  Keep follow up appointment with Cardiologist.

## 2024-07-18 NOTE — ASSESSMENT & PLAN NOTE
Continue pantoprazole as prescribed.  Avoid spicy, acidic, fried foods and alcohol.  Avoid Eat 2-3 hours before going to bed.  Chew food thoroughly and reduce caffeine intake, avoid soda.

## 2024-07-25 ENCOUNTER — TELEPHONE (OUTPATIENT)
Dept: FAMILY MEDICINE | Facility: CLINIC | Age: 84
End: 2024-07-25
Payer: MEDICARE

## 2024-07-25 NOTE — TELEPHONE ENCOUNTER
Called Ani with [facility] and informed her I do not have any orders for pt and it is too early to do her Wellness labs

## 2024-07-25 NOTE — TELEPHONE ENCOUNTER
----- Message from Shiva Cabrera sent at 7/25/2024 10:50 AM CDT -----  .Type:  Needs Medical Advice    Who Called: Desire  Symptoms (please be specific):    How long has patient had these symptoms:    Pharmacy name and phone #:    Would the patient rather a call back or a response via [facility]?   Best Call Back Number: [phone]  Additional Information: Patient requested a call back she is waiting at blood place since 9 to do her blood work and there is no orders there. Please call her back. [facility] in [place] she told me is going home she it tired of waiting.

## 2024-07-25 NOTE — TELEPHONE ENCOUNTER
----- Message from Qminder sent at 7/25/2024 10:28 AM CDT -----  Who Called: Desire Connors    What order is the patient requesting: lab orders   When does the expect the orders to be performed?           Preferred Method of Contact: Phone Call  Patient's Preferred Phone Number on File: [phone]   Best Call Back Number, if different:  Additional Information: pt is at [facility] and staff member kerri called and stated they have not received the orders yet.  Contact number [phone].

## 2024-07-30 DIAGNOSIS — E11.42 CONTROLLED TYPE 2 DIABETES MELLITUS WITH DIABETIC POLYNEUROPATHY, WITHOUT LONG-TERM CURRENT USE OF INSULIN: Primary | ICD-10-CM

## 2024-07-30 RX ORDER — PIOGLITAZONEHYDROCHLORIDE 15 MG/1
TABLET ORAL
Qty: 90 TABLET | Refills: 3 | Status: SHIPPED | OUTPATIENT
Start: 2024-07-30

## 2024-09-03 DIAGNOSIS — I10 ESSENTIAL (PRIMARY) HYPERTENSION: ICD-10-CM

## 2024-09-03 RX ORDER — LOSARTAN POTASSIUM 100 MG/1
TABLET ORAL
Qty: 90 TABLET | Refills: 3 | Status: SHIPPED | OUTPATIENT
Start: 2024-09-03

## 2024-11-06 DIAGNOSIS — E11.42 CONTROLLED TYPE 2 DIABETES MELLITUS WITH DIABETIC POLYNEUROPATHY, WITHOUT LONG-TERM CURRENT USE OF INSULIN: ICD-10-CM

## 2024-11-06 DIAGNOSIS — I10 ESSENTIAL (PRIMARY) HYPERTENSION: ICD-10-CM

## 2024-11-06 DIAGNOSIS — R79.9 ABNORMAL BLOOD FINDINGS: ICD-10-CM

## 2024-11-06 DIAGNOSIS — Z00.00 LABORATORY EXAMINATION ORDERED AS PART OF A ROUTINE GENERAL MEDICAL EXAMINATION: Primary | ICD-10-CM

## 2024-11-13 ENCOUNTER — OFFICE VISIT (OUTPATIENT)
Dept: FAMILY MEDICINE | Facility: CLINIC | Age: 84
End: 2024-11-13
Payer: MEDICARE

## 2024-11-13 VITALS
RESPIRATION RATE: 18 BRPM | OXYGEN SATURATION: 99 % | SYSTOLIC BLOOD PRESSURE: 138 MMHG | BODY MASS INDEX: 41.05 KG/M2 | HEART RATE: 61 BPM | DIASTOLIC BLOOD PRESSURE: 78 MMHG | WEIGHT: 246.38 LBS | HEIGHT: 65 IN

## 2024-11-13 DIAGNOSIS — E66.813 CLASS 3 SEVERE OBESITY DUE TO EXCESS CALORIES WITH SERIOUS COMORBIDITY AND BODY MASS INDEX (BMI) OF 40.0 TO 44.9 IN ADULT: ICD-10-CM

## 2024-11-13 DIAGNOSIS — H91.93 HEARING DECREASED, BILATERAL: ICD-10-CM

## 2024-11-13 DIAGNOSIS — E11.69 HYPERLIPIDEMIA ASSOCIATED WITH TYPE 2 DIABETES MELLITUS: ICD-10-CM

## 2024-11-13 DIAGNOSIS — E66.01 CLASS 3 SEVERE OBESITY DUE TO EXCESS CALORIES WITH SERIOUS COMORBIDITY AND BODY MASS INDEX (BMI) OF 40.0 TO 44.9 IN ADULT: ICD-10-CM

## 2024-11-13 DIAGNOSIS — E78.5 HYPERLIPIDEMIA ASSOCIATED WITH TYPE 2 DIABETES MELLITUS: ICD-10-CM

## 2024-11-13 DIAGNOSIS — Z12.31 VISIT FOR SCREENING MAMMOGRAM: ICD-10-CM

## 2024-11-13 DIAGNOSIS — Z23 FLU VACCINE NEED: ICD-10-CM

## 2024-11-13 DIAGNOSIS — D70.9 NEUTROPENIA, UNSPECIFIED TYPE: ICD-10-CM

## 2024-11-13 DIAGNOSIS — E11.42 CONTROLLED TYPE 2 DIABETES MELLITUS WITH DIABETIC POLYNEUROPATHY, WITHOUT LONG-TERM CURRENT USE OF INSULIN: ICD-10-CM

## 2024-11-13 DIAGNOSIS — E11.59 HYPERTENSION ASSOCIATED WITH DIABETES: ICD-10-CM

## 2024-11-13 DIAGNOSIS — I15.2 HYPERTENSION ASSOCIATED WITH DIABETES: ICD-10-CM

## 2024-11-13 DIAGNOSIS — Z00.00 MEDICARE ANNUAL WELLNESS VISIT, SUBSEQUENT: Primary | ICD-10-CM

## 2024-11-13 RX ORDER — PREGABALIN 75 MG/1
75 CAPSULE ORAL 2 TIMES DAILY PRN
Qty: 60 CAPSULE | Refills: 2 | Status: SHIPPED | OUTPATIENT
Start: 2024-11-13 | End: 2025-02-11

## 2024-11-13 RX ORDER — TIRZEPATIDE 5 MG/.5ML
5 INJECTION, SOLUTION SUBCUTANEOUS
Qty: 2 ML | Refills: 1 | Status: SHIPPED | OUTPATIENT
Start: 2024-11-13 | End: 2025-01-12

## 2024-11-13 NOTE — PATIENT INSTRUCTIONS
Eduardo Phipps,     If you are due for any health screening(s) below please notify me so we can arrange them to be ordered and scheduled. Most healthy patients at your age complete them, but you are free to accept or refuse.     If you can't do it, I'll definitely understand. If you can, I'd certainly appreciate it!    Tests to Keep You Healthy    Eye Exam: DUE  Last Blood Pressure <= 139/89 (11/13/2024): Yes      Your diabetic retinal eye exam is due     Diabetes is the #1 cause of blindness in the [place] - early detection before signs or symptoms develop can prevent debilitating blindness.     Our records indicate that you may be overdue for your annual diabetic eye exam. Eye screening can help identify patients at risk for developing vision loss which is common in diabetes. This simple screening is an important step to keeping you healthy and preventing complications from diabetes.     This recommended diabetic eye exam should take place once per year and can prevent and treat diabetes complications in the eye before developing symptoms. This can be done with a special camera is used to take photographs of the back of your eye without having to dilate them, or you can see an eye doctor for a full dilated exam.     If you recently had your yearly diabetic eye exam performed outside of [facility], please let your Health care team know so that they can update your health record.

## 2024-11-13 NOTE — PROGRESS NOTES
Patient ID: [id number]     Chief Complaint: Annual Exam        HPI:     Desire Connors is a 84 y.o. female here today for a Medicare Wellness.   Well Adult History   The patient presents for well adult exam. The patient's general health status is described as good. The patient's diet is described as balanced. Exercise: none. Associated symptoms consist of denies fatigue, denies weight loss, denies weight gain, denies headache, denies hearing loss and denies vision changes. Last menstrual period: patient no longer menstruates due to hysterectomy, last DEXA scan was 12/11/2023, NL, due in 12/2025. Additional pertinent history: last eye exam: 2024 (With Dr. Fernandez-Ophthalmology), last mammogram: 12/13/2023 (WNL at [facility] in [place], she would like scheduled), last colonoscopy: 06/24/2021 (with GI (Dr. Calderón), seat belt use, no caffeine use, tobacco use none, no alcohol use and She is due for annual wellness labs, she is not fasting, . DM II is controlled with Rx, she doesn't like the side effects from Jardiance, she is taking Mounjaro for diabetes and weight loss and she has lost 24 pounds since last visit, no side effects, she would like to proceed with increased dose, she denies family history of medullary thyroid caner or MEN II or personal history of pancreatitis, last fasting CBG was 140, denies polyuria, polyphagia, or polydipsia. Neuropathy is not controlled with Gabapentin p.r.n., she stopped taking Gabapentin due to concern about side effects, she would like to try a different Rx if possible. She denies adverse Rx side effects. She would like flu vaccine today, she will get COVID-19 vaccine, Shingrix, and Tdap from her pharmacy and is UTD on all other vaccines. HTN/PAD is controlled with Rx, asymptomatic and followed by Cardiology (Dr. Adam). Hypercholesterolemia is controlled with Rx, asymptomatic, no side effects. She is obese, wants to try to lose weight on her own, refuses weight loss surgery,  or dietician referral. Dementia is controlled with Rx, no side effects, asymptomatic.   - Patient is without any other complaints today.     denies Urinary leakage.  denies Recent falls or balance difficulty.   admits to Daily exercise or physical activity.  denies Depression, stress, anxiety, or emotional lability.   admits to The need for healthcare treatment including a cane/walker, blood pressure monitoring, or regular vision/hearing tests, would like a hearing evaluation in [place]..       A separate E/M code has been provided to evaluate additional complaints that the patient would like addressed during the dedicated Medicare Wellness Exam.    Patient Care Team:  Lotus Cunningham MD as PCP - General (Family Medicine)     Opioid Screening: Patient medication list reviewed, patient is not taking prescription opioids. Patient is not using additional opioids than prescribed. Patient is at low risk of substance abuse based on this opioid use history.      Advance Care Planning     Date: 11/13/2024  Patient did not wish or was not able to name a surrogate decision maker or provide an Advance Care Plan.        -------------------------------------    CAD (coronary artery disease)    Chronic sinusitis, unspecified    Diabetic neuropathy    Essential (primary) hypertension    GERD (gastroesophageal reflux disease)    Hiatal hernia    Hypercholesteremia    Microalbuminuria    Obesity, unspecified    PAD (peripheral artery disease)    Peripheral edema    Seasonal allergic rhinitis    Type 2 diabetes mellitus without complications    Unspecified cataract    Unspecified dementia without behavioral disturbance    Vitamin D deficiency        Past Surgical History:   Procedure Laterality Date    CARDIAC CATHETERIZATION      COLONOSCOPY      HYSTERECTOMY         Review of patient's allergies indicates:   Allergen Reactions    Ciprofloxacin Swelling     Other reaction(s): Swelling    Sulfa (sulfonamide antibiotics)         Outpatient Medications Marked as Taking for the 11/13/24 encounter (Office Visit) with Lotus Cunningham MD   Medication Sig Dispense Refill    aspirin (ECOTRIN) 81 MG EC tablet Take 81 mg by mouth once daily.      blood sugar diagnostic Strp 100 each by Other route once daily. USE TO CHECK FASTING CBG      blood-glucose meter kit 1 each by Other route once daily. USE TO CHECK CBG      donepeziL (ARICEPT) 10 MG tablet Take 1 tablet (10 mg total) by mouth once daily. 90 tablet 3    fluticasone propionate (FLONASE) 50 mcg/actuation nasal spray USE 2 SPRAYS IN EACH NOSTRIL DAILY AS NEEDED FOR ALLERGY SYMPTOMS 48 mL 3    furosemide (LASIX) 20 MG tablet Take 1 tablet (20 mg total) by mouth once daily. 90 tablet 0    hydrALAZINE (APRESOLINE) 100 MG tablet TAKE 1 TABLET BY MOUTH THREE TIMES A  tablet 3    lancets Misc 100 each by Other route once daily. USE TO CHECK FASTING CBG      pravastatin (PRAVACHOL) 80 MG tablet Take 1 tablet (80 mg total) by mouth every evening. 90 tablet 3    vitamin D (VITAMIN D3) 1000 units Tab Take 1 tablet by mouth once daily.      [DISCONTINUED] tirzepatide (MOUNJARO) 2.5 mg/0.5 mL PnIj INJECT 2.5 MG SUBCUTANEOUSLY EVERY 7 DAYS 4 Pen 3     Current Facility-Administered Medications for the 11/13/24 encounter (Office Visit) with Lotus Cunningham MD   Medication Dose Route Frequency Provider Last Rate Last Admin    influenza (adjuvanted) (Fluad) 45 mcg/0.5 mL IM vaccine (> or = 66 yo) 0.5 mL  0.5 mL Intramuscular 1 time in Clinic/HOD            Social History     Socioeconomic History    Marital status:    Tobacco Use    Smoking status: Never    Smokeless tobacco: Never   Substance and Sexual Activity    Alcohol use: Never    Drug use: Never    Sexual activity: Never        Family History   Problem Relation Name Age of Onset    Heart disease Mother      Heart disease Father          Subjective:     ROS      See HPI for details    Constitutional: No fever, No  "chills, No fatigue.   Eye: No blurring, No visual disturbances.   Ear/Nose/Mouth/Throat: No decreased hearing, No ear pain, No nasal congestion, No sore throat.   Respiratory: No shortness of breath, No cough, No wheezing.   Cardiovascular: Peripheral edema, No chest pain, No palpitations.   Breast: Both breasts, No lump/ mass, No pain.   Nipple discharge: None.   Gastrointestinal: No nausea, No vomiting, No diarrhea, No constipation, No abdominal pain.   Genitourinary: No dysuria, No hematuria.   Gynecologic: Negative except as documented in history of present illness.   Hematology/Lymphatics: No bruising tendency, No bleeding tendency, No swollen lymph glands.   Endocrine: No excessive thirst, No polyuria, No excessive hunger.   Musculoskeletal: No joint pain, No muscle pain, No decreased range of motion.   Integumentary: No rash, Reports pruritus.   Neurologic: No abnormal balance, No confusion, No headache.   Psychiatric: No anxiety, No depression, Not suicidal, No hallucinations.     All Other ROS: Negative except as stated in HPI.       Objective:     /78 (BP Location: Right arm, Patient Position: Sitting)   Pulse 61   Resp 18   Ht 5' 5" (1.651 m)   Wt 111.8 kg (246 lb 6.4 oz)   SpO2 99%   BMI 41.00 kg/m²     Physical Exam    General: Alert and oriented, No acute distress.   Appearance: Morbidly obese.   Eye: Pupils are equal, round and reactive to light, Extraocular movements are intact, Normal conjunctiva.   HENT: Normocephalic, Tympanic membranes are clear, Normal hearing, Oral mucosa is moist, No pharyngeal erythema.   Throat: Pharynx ( Not edematous, No exudate ).   Neck: Supple, Non-tender, No carotid bruit, No lymphadenopathy, No thyromegaly.   Respiratory: Lungs are clear to auscultation, Respirations are non-labored, Breath sounds are equal, Symmetrical chest wall expansion, No chest wall tenderness.   Cardiovascular: Normal rate, Regular rhythm, No murmur, Good pulses equal in all " extremities.   Edema: Bilateral, Lower extremity, 1+, Pitting.   Gastrointestinal: Soft, Non-tender, Non-distended, Normal bowel sounds, No organomegaly.   Genitourinary: No costovertebral angle tenderness.   Musculoskeletal: Normal range of motion, No tenderness, Normal gait. Walks with a rollator.   Protective Sensation (w/ 10 gram monofilament):  Right: Intact  Left: Intact     Visual Inspection:  Normal -  Bilateral     Pedal Pulses:   Right: Present  Left: Present     Posterior Tibialis Pulses:   Right:Present  Left: Present   Integumentary:   Neurologic: No focal deficits, Cranial Nerves II-XII are grossly intact.   Psychiatric: Cooperative, Appropriate mood & affect, Normal judgment, Non-suicidal.   Mood and affect: Calm.   Behavior: Relaxed.         Assessment:       ICD-10-CM ICD-9-CM   1. Medicare annual wellness visit, subsequent  Z00.00 V70.0   2. Flu vaccine need  Z23 V04.81   3. Visit for screening mammogram  Z12.31 V76.12   4. Controlled type 2 diabetes mellitus with diabetic polyneuropathy, without long-term current use of insulin  E11.42 250.60     357.2   5. Hypertension associated with diabetes  E11.59 250.80    I15.2 401.9   6. Hyperlipidemia associated with type 2 diabetes mellitus  E11.69 250.80    E78.5 272.4   7. Neutropenia, unspecified type  D70.9 288.00   8. Class 3 severe obesity due to excess calories with serious comorbidity and body mass index (BMI) of 40.0 to 44.9 in adult  E66.813 278.01    E66.01 V85.41    Z68.41    9. Hearing decreased, bilateral  H91.93 389.9        Plan:       Medicare Annual Wellness and Personalized Prevention Plan:     Fall Risk + Home Safety + Hearing Impairment + Depression Screen + Cognitive Impairment Screen + Health Risk Assessment all reviewed.          No data to display                  11/13/2024     1:23 PM 7/15/2024     1:05 PM 6/28/2023     1:20 PM 3/23/2023     1:19 PM   Depression Screeening PHQ2   Over the last two weeks how often have you been  bothered by little interest or pleasure in doing things 0 0 0 0   Over the last two weeks how often have you been bothered by feeling down, depressed or hopeless 0 0 0 0   PHQ-2 Total Score 0 0 0 0         11/13/2024     1:30 PM 7/15/2024     1:00 PM 6/28/2023     1:30 PM 3/23/2023     1:00 PM   Fall Risk Assessment - Outpatient   Mobility Status Ambulatory Ambulatory w/ assistance Ambulatory Ambulatory w/ assistance   Number of falls 0 0 0 0   Identified as fall risk False True False True                               Alcohol/Tobacco Use - Stressed importance of smoking cessation and limiting alcohol intake.  CVD Risk Factors - Reviewed  Obesity/Physical Activity - Normal BMI. Encouraged daily 30 minute physical activity x 5 days per week.      Health Maintenance Topics with due status: Not Due       Topic Last Completion Date    DEXA Scan 12/11/2023        Vaccinations -   Immunization History   Administered Date(s) Administered    COVID-19, MRNA, LN-S, PF (MODERNA FULL 0.5 ML DOSE) 02/03/2021, 03/10/2021    Influenza - Trivalent - Fluarix, Flulaval, Fluzone, Afluria - PF 10/08/2009, 10/05/2011, 10/19/2013, 10/14/2014, 12/11/2015, 11/02/2016, 10/18/2017, 10/20/2020, 11/08/2021    Pneumococcal Conjugate - 13 Valent 10/07/2019    Pneumococcal Polysaccharide - 23 Valent 01/15/2021          1. Medicare annual wellness visit, subsequent  - CBC Auto Differential; Future  - CK; Future  - Comprehensive Metabolic Panel; Future  - Hemoglobin A1C; Future  - Lipid Panel; Future  - Microalbumin/Creatinine Ratio, Urine; Future  - TSH; Future  - Vitamin D; Future  - Urinalysis, Reflex to Urine Culture; Future  - Will treat pending lab results. Monthly breast self exam encouraged. Diet, exercise, and 10% weight loss encouraged. Keep appointment for dental exams x q6 months as scheduled. Keep appointment for annual eye exam as scheduled. Keep appointment with specialists as scheduled. Notify M.D. or ER if temp greater than 100.4,  or any acute illness.      2. Flu vaccine need  - influenza (adjuvanted) (Fluad) 45 mcg/0.5 mL IM vaccine (> or = 66 yo) 0.5 mL IM x 1 today.    3. Visit for screening mammogram  - Mammo Digital Screening Bilat w/ Kenn; Future    4. Controlled type 2 diabetes mellitus with diabetic polyneuropathy, without long-term current use of insulin  - pregabalin (LYRICA) 75 MG capsule; Take 1 capsule (75 mg total) by mouth 2 (two) times daily as needed (nerve pain).  Dispense: 60 capsule; Refill: 2  - tirzepatide (MOUNJARO) 5 mg/0.5 mL PnIj; Inject 5 mg into the skin every 7 days.  Dispense: 2 mL; Refill: 1  - Hemoglobin A1C; Future  - Microalbumin/Creatinine Ratio, Urine; Future  - Discontinue Gabapentin due to side effects. Rx trial of Lyrica for neuropathy. Diet. exercise, and 10% weight loss encouraged. Rx trial of increased dose of Mounjaro to 5 mg weekly with close monitoring to help with diabetes control and obesity. Continue Actos. Will titrate Rx Mounjaro as needed/tolerated until max dose achieved. Continue remaining DM II Rx as prescribed. Recheck CMP, HgA1C. Notify M.D. or ER if symptoms persist or worsen, adverse Rx side effects, temp greater than 100.4, or any acute illness.    Lab Results   Component Value Date    HGBA1C 5.6 06/12/2023      Continue   On ARB and Statin according to guidelines.  Follow ADA Diet. Avoid soda, simple sweets, and limit rice/pasta/breads/starches.  Maintain healthy weight with goal BMI <30.  Exercise 5 times per week for 30 minutes per day.  Stressed importance of daily foot exams.  Stressed importance of annual dilated eye exam.     5. Hypertension associated with diabetes  - CBC Auto Differential; Future  - BP is well controlled. Continue Hydralazine, Cozaar as prescribed. Continue followup with Cardiology as scheduled. Keep daily BP log. Will titrate BP Rx until BP is <140/90. Notify M.D. or ER if BP >170/100 or <90/60, chest pain, palpitations, headache, SOB, temp greater than  100.4, or any acute illness.   Continue  Low Sodium Diet (DASH Diet - Less than 2 grams of sodium per day).  Monitor blood pressure daily and log. Report consistent numbers greater than 140/90.  Smoking cessation encouraged to aid in BP reduction.  Maintain healthy weight with goal BMI <30. Exercise 30 minutes per day, 5 days per week.      6. Hyperlipidemia associated with type 2 diabetes mellitus  - CK; Future  - Lipid Panel; Future  - Continue Pravachol as prescribed. Will treat pending results.   Continue  Stressed importance of dietary modifications. Follow a low cholesterol, low saturated fat diet with less that 200mg of cholesterol a day.  Avoid fried foods and high saturated fats (high saturated fats less than 7% of calories).  Add Flax Seed/Fish Oil supplements to diet. Increase dietary fiber.  Regular exercise can reduce LDL and raise HDL. Stressed importance of physical activity 5 times per week for 30 minutes per day.      7. Neutropenia, unspecified type  - Asymptomatic, will treat pending results.     8. Class 3 severe obesity due to excess calories with serious comorbidity and body mass index (BMI) of 40.0 to 44.9 in adult  - Vitamin D; Future  - Same as #4.   Body mass index is 41 kg/m².  Goal BMI <30.  Exercise 5 times a week for 30 minutes per day.  Avoid soda, simple sugars, excessive rice, potatoes or bread. Limit fast foods and fried foods.  Choose complex carbs in moderation (example: green vegetables, beans, oatmeal). Eat plenty of fresh fruits and vegetables with lean meats daily.  Do not skip meals. Eat a balanced portion size.  Avoid fad diets. Consider permanent healthy life style changes.      9. Hearing decreased, bilateral  - Ambulatory referral/consult to Audiology; Future for hearing evaluation.         Medication List with Changes/Refills   New Medications    PREGABALIN (LYRICA) 75 MG CAPSULE    Take 1 capsule (75 mg total) by mouth 2 (two) times daily as needed (nerve pain).        Start Date: 11/13/2024End Date: 2/11/2025    TIRZEPATIDE (MOUNJARO) 5 MG/0.5 ML PNIJ    Inject 5 mg into the skin every 7 days.       Start Date: 11/13/2024End Date: 1/12/2025   Current Medications    ASPIRIN (ECOTRIN) 81 MG EC TABLET    Take 81 mg by mouth once daily.       Start Date: --        End Date: --    BLOOD SUGAR DIAGNOSTIC STRP    100 each by Other route once daily. USE TO CHECK FASTING CBG       Start Date: 10/11/2021End Date: --    BLOOD-GLUCOSE METER KIT    1 each by Other route once daily. USE TO CHECK CBG       Start Date: 10/11/2021End Date: --    DONEPEZIL (ARICEPT) 10 MG TABLET    Take 1 tablet (10 mg total) by mouth once daily.       Start Date: 7/15/2024 End Date: --    FLUTICASONE PROPIONATE (FLONASE) 50 MCG/ACTUATION NASAL SPRAY    USE 2 SPRAYS IN EACH NOSTRIL DAILY AS NEEDED FOR ALLERGY SYMPTOMS       Start Date: 7/8/2024  End Date: --    FUROSEMIDE (LASIX) 20 MG TABLET    Take 1 tablet (20 mg total) by mouth once daily.       Start Date: 5/3/2024  End Date: --    HYDRALAZINE (APRESOLINE) 100 MG TABLET    TAKE 1 TABLET BY MOUTH THREE TIMES A DAY       Start Date: 4/25/2024 End Date: --    LANCETS MISC    100 each by Other route once daily. USE TO CHECK FASTING CBG       Start Date: 10/11/2021End Date: --    LOSARTAN (COZAAR) 100 MG TABLET    TAKE 1 TABLET BY MOUTH EVERY DAY       Start Date: 9/3/2024  End Date: --    PIOGLITAZONE (ACTOS) 15 MG TABLET    TAKE 1 TABLET BY MOUTH EVERY DAY       Start Date: 7/30/2024 End Date: --    PRAVASTATIN (PRAVACHOL) 80 MG TABLET    Take 1 tablet (80 mg total) by mouth every evening.       Start Date: 6/28/2023 End Date: 11/13/2024    VITAMIN D (VITAMIN D3) 1000 UNITS TAB    Take 1 tablet by mouth once daily.       Start Date: --        End Date: --   Discontinued Medications    AMLODIPINE (NORVASC) 5 MG TABLET    Take 1 tablet by mouth once daily.       Start Date: 7/27/2021 End Date: 11/13/2024    BETAMETHASONE VALERATE 0.1% (VALISONE) 0.1 % CREA     Apply topically 2 (two) times daily as needed (rash).       Start Date: 6/28/2023 End Date: 11/13/2024    DICLOFENAC (VOLTAREN) 75 MG EC TABLET    Take 75 mg by mouth 2 (two) times daily as needed.       Start Date: 12/15/2021End Date: 11/13/2024    GABAPENTIN (NEURONTIN) 300 MG CAPSULE    TAKE 1 CAP TWICE DAILY AS NEEDED FOR NERVE PAIN       Start Date: 12/5/2022 End Date: 11/13/2024    MUPIROCIN (BACTROBAN) 2 % OINTMENT    Apply topically 2 (two) times daily.       Start Date: 4/27/2023 End Date: 11/13/2024    PANTOPRAZOLE (PROTONIX) 40 MG TABLET    Take 1 tablet (40 mg total) by mouth once daily.       Start Date: 7/15/2024 End Date: 11/13/2024    PRASUGREL (EFFIENT) 10 MG TAB    Take 10 mg by mouth once daily.       Start Date: --        End Date: 11/13/2024    TIRZEPATIDE (MOUNJARO) 2.5 MG/0.5 ML PNIJ    INJECT 2.5 MG SUBCUTANEOUSLY EVERY 7 DAYS       Start Date: 4/11/2024 End Date: 11/13/2024          The patient's Health Maintenance was reviewed and the following appears to be due at this time:   Health Maintenance Due   Topic Date Due    TETANUS VACCINE  Never done    Shingles Vaccine (1 of 2) Never done    RSV Vaccine (Age 60+ and Pregnant patients) (1 - 1-dose 75+ series) Never done    Eye Exam  11/17/2023    Hemoglobin A1c  12/12/2023    Lipid Panel  06/12/2024    Diabetes Urine Screening  07/05/2024    Influenza Vaccine (1) 09/01/2024    COVID-19 Vaccine (6 - 2024-25 season) 09/01/2024         Follow up in about 3 months (around 2/13/2025) for Diabetes Followup, Neuropathy Followup; *Records from Dr. Fernandez for eye exam*.

## 2024-11-13 NOTE — LETTER
AUTHORIZATION FOR RELEASE OF   CONFIDENTIAL INFORMATION    Dear Medical Records,    We are seeing Desire Connors, date of birth 1940, in the clinic at [facility]. Lotus Cunningham MD is the patient's PCP. Desire Connors has an outstanding lab/procedure at the time we reviewed her chart. In order to help keep her health information updated, she has authorized us to request the following medical record(s):        (  )  MAMMOGRAM                                      (  )  COLONOSCOPY      (  )  PAP SMEAR                                          (  )  OUTSIDE LAB RESULTS     (  )  DEXA SCAN                                          ( X )  EYE EXAM            (  )  FOOT EXAM                                          (  )  ENTIRE RECORD     (  )  OUTSIDE IMMUNIZATIONS                 (  )  _______________         Please fax records to Lotus Cunningham MD, [phone]     If you have any questions, please contact Jayna Beckman LPN at [phone].           Patient Name: Desire Connors  : 1940  Patient Phone #: [phone]

## 2024-11-19 ENCOUNTER — TELEPHONE (OUTPATIENT)
Dept: FAMILY MEDICINE | Facility: CLINIC | Age: 84
End: 2024-11-19
Payer: MEDICARE

## 2024-11-19 NOTE — TELEPHONE ENCOUNTER
----- Message from Rickey sent at 11/19/2024  3:08 PM CST -----  .Who Called: Desire Connors    Caller is requesting assistance/information from provider's office.    Symptoms (please be specific):    How long has patient had these symptoms:    List of preferred pharmacies on file (remove unneeded):       Preferred Method of Contact: Phone Call  Patient's Preferred Phone Number on File: [phone]   Best Call Back Number, if different:  Additional Information:  pt called to discuss medication pioglitazone (ACTOS) 15 MG tablet

## 2024-11-19 NOTE — TELEPHONE ENCOUNTER
Pt was calling to ask if she needed to continue her Actos with the Mounjaro. According to the LOV pt is to continuing Actos as prescribed

## 2024-12-23 ENCOUNTER — TELEPHONE (OUTPATIENT)
Dept: FAMILY MEDICINE | Facility: CLINIC | Age: 84
End: 2024-12-23
Payer: MEDICARE

## 2024-12-23 NOTE — TELEPHONE ENCOUNTER
----- Message from Florence Simmons MD sent at 12/16/2024  9:08 AM CST -----  Please notify patient normal mammogram

## 2025-01-10 DIAGNOSIS — E11.42 CONTROLLED TYPE 2 DIABETES MELLITUS WITH DIABETIC POLYNEUROPATHY, WITHOUT LONG-TERM CURRENT USE OF INSULIN: ICD-10-CM

## 2025-01-10 RX ORDER — TIRZEPATIDE 5 MG/.5ML
5 INJECTION, SOLUTION SUBCUTANEOUS
Qty: 4 PEN | Refills: 1 | Status: SHIPPED | OUTPATIENT
Start: 2025-01-10 | End: 2025-03-11

## 2025-01-13 ENCOUNTER — TELEPHONE (OUTPATIENT)
Dept: FAMILY MEDICINE | Facility: CLINIC | Age: 85
End: 2025-01-13
Payer: MEDICARE

## 2025-01-13 DIAGNOSIS — E11.42 CONTROLLED TYPE 2 DIABETES MELLITUS WITH DIABETIC POLYNEUROPATHY, WITHOUT LONG-TERM CURRENT USE OF INSULIN: Primary | ICD-10-CM

## 2025-01-13 NOTE — TELEPHONE ENCOUNTER
----- Message from Nurse Pat sent at 1/13/2025 11:27 AM CST -----  Oops.. Mouncarlylera  ----- Message -----  From: Lotus Cunningham MD  Sent: 1/13/2025   9:29 AM CST  To: Adilia Marina LPN    Good morning. Please advise which medication this is in reference to so that I can address. Thank you for choosing [facility] for your medical needs. Have a great day!    -Lotus Cunningham MD, [facility]  ----- Message -----  From: Adilia Marina LPN  Sent: 1/13/2025   8:40 AM CST  To: oLtus Cunningham MD      ----- Message -----  From: Jose Manuel Oh  Sent: 1/11/2025   1:05 PM CST  To: Marisa BEARD Staff    Type: General Call Back     Name of Caller:Pt  Symptoms:medicine  Would the patient rather a call back or a response via [employer]? Call back  Best Call Back Number:[phone]   Additional Information: Pt needs her medicine but her deducible needs to be covered and she can afford that at this time. She would like to know if there is something cheaper.

## 2025-02-13 ENCOUNTER — OFFICE VISIT (OUTPATIENT)
Dept: FAMILY MEDICINE | Facility: CLINIC | Age: 85
End: 2025-02-13
Payer: MEDICARE

## 2025-02-13 VITALS
HEART RATE: 62 BPM | RESPIRATION RATE: 18 BRPM | HEIGHT: 65 IN | DIASTOLIC BLOOD PRESSURE: 90 MMHG | SYSTOLIC BLOOD PRESSURE: 178 MMHG | OXYGEN SATURATION: 98 % | BODY MASS INDEX: 42.65 KG/M2 | WEIGHT: 256 LBS | TEMPERATURE: 98 F

## 2025-02-13 DIAGNOSIS — E11.59 HYPERTENSION ASSOCIATED WITH DIABETES: ICD-10-CM

## 2025-02-13 DIAGNOSIS — E66.813 CLASS 3 SEVERE OBESITY DUE TO EXCESS CALORIES WITH SERIOUS COMORBIDITY AND BODY MASS INDEX (BMI) OF 40.0 TO 44.9 IN ADULT: ICD-10-CM

## 2025-02-13 DIAGNOSIS — E11.69 HYPERLIPIDEMIA ASSOCIATED WITH TYPE 2 DIABETES MELLITUS: ICD-10-CM

## 2025-02-13 DIAGNOSIS — F03.90 DEMENTIA, UNSPECIFIED DEMENTIA SEVERITY, UNSPECIFIED DEMENTIA TYPE, UNSPECIFIED WHETHER BEHAVIORAL, PSYCHOTIC, OR MOOD DISTURBANCE OR ANXIETY: ICD-10-CM

## 2025-02-13 DIAGNOSIS — E66.01 CLASS 3 SEVERE OBESITY DUE TO EXCESS CALORIES WITH SERIOUS COMORBIDITY AND BODY MASS INDEX (BMI) OF 40.0 TO 44.9 IN ADULT: ICD-10-CM

## 2025-02-13 DIAGNOSIS — I15.2 HYPERTENSION ASSOCIATED WITH DIABETES: ICD-10-CM

## 2025-02-13 DIAGNOSIS — E78.5 HYPERLIPIDEMIA ASSOCIATED WITH TYPE 2 DIABETES MELLITUS: ICD-10-CM

## 2025-02-13 DIAGNOSIS — E11.42 CONTROLLED TYPE 2 DIABETES MELLITUS WITH DIABETIC POLYNEUROPATHY, WITHOUT LONG-TERM CURRENT USE OF INSULIN: Primary | ICD-10-CM

## 2025-02-13 RX ORDER — TIRZEPATIDE 2.5 MG/.5ML
2.5 INJECTION, SOLUTION SUBCUTANEOUS
Qty: 2 ML | Refills: 2 | Status: SHIPPED | OUTPATIENT
Start: 2025-02-13 | End: 2025-05-14

## 2025-02-13 RX ORDER — FUROSEMIDE 20 MG/1
20 TABLET ORAL DAILY
Qty: 90 TABLET | Refills: 3 | Status: SHIPPED | OUTPATIENT
Start: 2025-02-13

## 2025-02-13 RX ORDER — ROSUVASTATIN CALCIUM 40 MG/1
40 TABLET, COATED ORAL NIGHTLY
Qty: 90 TABLET | Refills: 3 | Status: SHIPPED | OUTPATIENT
Start: 2025-02-13 | End: 2026-02-13

## 2025-02-13 RX ORDER — ROSUVASTATIN CALCIUM 40 MG/1
40 TABLET, COATED ORAL DAILY
COMMUNITY
Start: 2025-01-10 | End: 2025-02-13 | Stop reason: SDUPTHER

## 2025-02-13 NOTE — PROGRESS NOTES
Patient ID: [id number]     Chief Complaint: Follow-up and Diabetes        HPI:     Desire Connors is a 84 y.o. female here today for a follow up DM II.   - DM II is controlled with Rx, she doesn't like the side effects from Jardiance, she was taking Mounjaro for diabetes and weight loss and she has lost 24 pounds since last visit, no side effects, but her insurance had her switch to Ozempic and she reports Ozempic is too difficult to use, would like to resume Mounjaro, she denies family history of medullary thyroid caner or MEN II or personal history of pancreatitis, last fasting CBG was 140, denies polyuria, polyphagia, or polydipsia. She has labs orders from 11/13/2025, she will have done next available.   - Neuropathy is improving with Lyrica and she takes the Rx when she needs it, she denies adverse Rx side effects, she has Rx at home.   -  HTN/PAD is controlled with Rx, asymptomatic and followed by Cardiology (Dr. Adam). She needs Rx refill of Lasix today  - Hypercholesterolemia is controlled with Rx, asymptomatic, no side effects.   - She is obese, wants to try to lose weight on her own, refuses weight loss surgery, or dietician referral.   - Dementia is controlled with Rx, no side effects, asymptomatic.   - Patient is without any other complaints today.         -------------------------------------    CAD (coronary artery disease)    Chronic sinusitis, unspecified    Diabetic neuropathy    Essential (primary) hypertension    GERD (gastroesophageal reflux disease)    Hiatal hernia    Hypercholesteremia    Microalbuminuria    Obesity, unspecified    PAD (peripheral artery disease)    Peripheral edema    Seasonal allergic rhinitis    Type 2 diabetes mellitus without complications    Unspecified cataract    Unspecified dementia without behavioral disturbance    Vitamin D deficiency        Past Surgical History:   Procedure Laterality Date    CARDIAC CATHETERIZATION      COLONOSCOPY      HYSTERECTOMY          Review of patient's allergies indicates:   Allergen Reactions    Ciprofloxacin Swelling     Other reaction(s): Swelling    Sulfa (sulfonamide antibiotics)        Outpatient Medications Marked as Taking for the 2/13/25 encounter (Office Visit) with Lotus Cunningham MD   Medication Sig Dispense Refill    aspirin (ECOTRIN) 81 MG EC tablet Take 81 mg by mouth once daily.      blood sugar diagnostic Strp 100 each by Other route once daily. USE TO CHECK FASTING CBG      blood-glucose meter kit 1 each by Other route once daily. USE TO CHECK CBG      donepeziL (ARICEPT) 10 MG tablet Take 1 tablet (10 mg total) by mouth once daily. 90 tablet 3    fluticasone propionate (FLONASE) 50 mcg/actuation nasal spray USE 2 SPRAYS IN EACH NOSTRIL DAILY AS NEEDED FOR ALLERGY SYMPTOMS 48 mL 3    hydrALAZINE (APRESOLINE) 100 MG tablet TAKE 1 TABLET BY MOUTH THREE TIMES A  tablet 3    lancets Misc 100 each by Other route once daily. USE TO CHECK FASTING CBG      losartan (COZAAR) 100 MG tablet TAKE 1 TABLET BY MOUTH EVERY DAY 90 tablet 3    pioglitazone (ACTOS) 15 MG tablet TAKE 1 TABLET BY MOUTH EVERY DAY 90 tablet 3    vitamin D (VITAMIN D3) 1000 units Tab Take 1 tablet by mouth once daily.      [DISCONTINUED] furosemide (LASIX) 20 MG tablet Take 1 tablet (20 mg total) by mouth once daily. 90 tablet 0    [DISCONTINUED] rosuvastatin (CRESTOR) 40 MG Tab Take 40 mg by mouth once daily.         Social History     Socioeconomic History    Marital status:    Tobacco Use    Smoking status: Never    Smokeless tobacco: Never   Substance and Sexual Activity    Alcohol use: Never    Drug use: Never    Sexual activity: Never        Family History   Problem Relation Name Age of Onset    Heart disease Mother      Heart disease Father          Subjective:       Review of Systems:    See HPI for details    Constitutional: Denies Change in appetite. Denies Chills. Denies Fever. Denies Night sweats.  Eye: Denies Blurred vision.  "Denies Discharge. Denies Eye pain.  ENT: Denies Decreased hearing. Denies Sore throat. Denies Swollen glands.  Respiratory: Denies Cough. Denies Shortness of breath. Denies Shortness of breath with exertion. Denies Wheezing.  Cardiovascular: Denies Chest pain at rest. Denies Chest pain with exertion. Denies Irregular heartbeat. Denies Palpitations.  Gastrointestinal: Denies Abdominal pain. Denies Diarrhea. Denies Nausea. Denies Vomiting. Denies Hematemesis or Hematochezia.  Genitourinary: Denies Dysuria. Denies Urinary frequency. Denies Urinary urgency. Denies Blood in urine.  Endocrine: Denies Cold intolerance. Denies Excessive thirst. Denies Heat intolerance. Denies Weight loss. Denies Weight gain.  Musculoskeletal: Denies Painful joints. Denies Weakness.  Integumentary: Denies Rash. Denies Itching. Denies Dry skin.  Neurologic: Denies Dizziness. Denies Fainting. Denies Headache.  Psychiatric: Denies Depression. Denies Anxiety. Denies Suicidal/Homicidal ideations.    All Other ROS: Negative except as stated in HPI.       Objective:     BP (!) 178/90 (BP Location: Right forearm, Patient Position: Sitting)   Pulse 62   Temp 97.6 °F (36.4 °C) (Oral)   Resp 18   Ht 5' 5" (1.651 m)   Wt 116.1 kg (256 lb)   SpO2 98%   BMI 42.60 kg/m²     Physical Exam    General: Alert and oriented, No acute distress.   Appearance: Morbidly obese.   Eye: Pupils are equal, round and reactive to light, Extraocular movements are intact, Normal conjunctiva.   HENT: Normocephalic, Tympanic membranes are clear, Normal hearing, Oral mucosa is moist, No pharyngeal erythema.   Throat: Pharynx ( Not edematous, No exudate ).   Neck: Supple, Non-tender, No carotid bruit, No lymphadenopathy, No thyromegaly.   Respiratory: Lungs are clear to auscultation, Respirations are non-labored, Breath sounds are equal, Symmetrical chest wall expansion, No chest wall tenderness.   Cardiovascular: Normal rate, Regular rhythm, No murmur, Good pulses equal " in all extremities.   Edema: Bilateral, Lower extremity, 1+, Pitting.   Gastrointestinal: Soft, Non-tender, Non-distended, Normal bowel sounds, No organomegaly.   Genitourinary: No costovertebral angle tenderness.   Musculoskeletal: Normal range of motion, No tenderness, Normal gait. Walks with a cane.   Neurologic: No focal deficits, Cranial Nerves II-XII are grossly intact.   Psychiatric: Cooperative, Appropriate mood & affect, Normal judgment, Non-suicidal.   Mood and affect: Calm.   Behavior: Relaxed.         Assessment:       ICD-10-CM ICD-9-CM   1. Controlled type 2 diabetes mellitus with diabetic polyneuropathy, without long-term current use of insulin  E11.42 250.60     357.2   2. Class 3 severe obesity due to excess calories with serious comorbidity and body mass index (BMI) of 40.0 to 44.9 in adult  E66.813 278.01    E66.01 V85.41    Z68.41    3. Dementia, unspecified dementia severity, unspecified dementia type, unspecified whether behavioral, psychotic, or mood disturbance or anxiety  F03.90 294.20   4. Hypertension associated with diabetes  E11.59 250.80    I15.2 401.9   5. Hyperlipidemia associated with type 2 diabetes mellitus  E11.69 250.80    E78.5 272.4        Plan:     Problem List Items Addressed This Visit          Neuro    Dementia, unspecified dementia severity, unspecified dementia type, unspecified whether behavioral, psychotic, or mood disturbance or anxiety     Other Visit Diagnoses       Controlled type 2 diabetes mellitus with diabetic polyneuropathy, without long-term current use of insulin    -  Primary    Relevant Medications    tirzepatide (MOUNJARO) 2.5 mg/0.5 mL PnIj    Class 3 severe obesity due to excess calories with serious comorbidity and body mass index (BMI) of 40.0 to 44.9 in adult        Hypertension associated with diabetes        Relevant Medications    furosemide (LASIX) 20 MG tablet    Hyperlipidemia associated with type 2 diabetes mellitus        Relevant Medications     rosuvastatin (CRESTOR) 40 MG Tab         1. Controlled type 2 diabetes mellitus with diabetic polyneuropathy, without long-term current use of insulin  - tirzepatide (MOUNJARO) 2.5 mg/0.5 mL PnIj; Inject 2.5 mg into the skin every 7 days.  Dispense: 2 mL; Refill: 2  - Continue Rx Lyrica for neuropathy. Diet. exercise, and 10% weight loss encouraged. Discontinue Ozepic due to ineffectiveness. Rx trial of resuming Mounjaro 2.5 mg weekly with close monitoring to help with diabetes control and obesity. Continue Actos. Will titrate Rx Mounjaro as needed/tolerated until max dose achieved. Continue remaining DM II Rx as prescribed. Recheck CMP, HgA1C. Notify M.D. or ER if symptoms persist or worsen, adverse Rx side effects, temp greater than 100.4, or any acute illness.    Lab Results   Component Value Date    HGBA1C 5.6 06/12/2023      Continue   On ARB and Statin according to guidelines.  Follow ADA Diet. Avoid soda, simple sweets, and limit rice/pasta/breads/starches.  Maintain healthy weight with goal BMI <30.  Exercise 5 times per week for 30 minutes per day.  Stressed importance of daily foot exams.  Stressed importance of annual dilated eye exam.     2. Class 3 severe obesity due to excess calories with serious comorbidity and body mass index (BMI) of 40.0 to 44.9 in adult  - Same as #1.   Body mass index is 42.6 kg/m².  Goal BMI <30.  Exercise 5 times a week for 30 minutes per day.  Avoid soda, simple sugars, excessive rice, potatoes or bread. Limit fast foods and fried foods.  Choose complex carbs in moderation (example: green vegetables, beans, oatmeal). Eat plenty of fresh fruits and vegetables with lean meats daily.  Do not skip meals. Eat a balanced portion size.  Avoid fad diets. Consider permanent healthy life style changes.      3. Dementia, unspecified dementia severity, unspecified dementia type, unspecified whether behavioral, psychotic, or mood disturbance or anxiety  - Stable, continue followup with  Neurology as scheduled.     4. Hypertension associated with diabetes  - furosemide (LASIX) 20 MG tablet; Take 1 tablet (20 mg total) by mouth once daily.  Dispense: 90 tablet; Refill: 3  - BP is was well controlled. Continue Hydralazine, Cozaar as prescribed. Rx Lasix refilled today. Continue followup with Cardiology as scheduled. Keep daily BP log. Will titrate BP Rx until BP is <140/90. Notify M.D. or ER if BP >170/100 or <90/60, chest pain, palpitations, headache, SOB, temp greater than 100.4, or any acute illness.   Continue  Low Sodium Diet (DASH Diet - Less than 2 grams of sodium per day).  Monitor blood pressure daily and log. Report consistent numbers greater than 140/90.  Smoking cessation encouraged to aid in BP reduction.  Maintain healthy weight with goal BMI <30. Exercise 30 minutes per day, 5 days per week.      5. Hyperlipidemia associated with type 2 diabetes mellitus  - Rx rosuvastatin (CRESTOR) 40 MG Tab; Take 1 tablet (40 mg total) by mouth every evening.  Dispense: 90 tablet; Refill: 3 refilled today, will treat pending results.   Continue  Stressed importance of dietary modifications. Follow a low cholesterol, low saturated fat diet with less that 200mg of cholesterol a day.  Avoid fried foods and high saturated fats (high saturated fats less than 7% of calories).  Add Flax Seed/Fish Oil supplements to diet. Increase dietary fiber.  Regular exercise can reduce LDL and raise HDL. Stressed importance of physical activity 5 times per week for 30 minutes per day.          Desire was seen today for follow-up and diabetes.    Diagnoses and all orders for this visit:    Controlled type 2 diabetes mellitus with diabetic polyneuropathy, without long-term current use of insulin  -     tirzepatide (MOUNJARO) 2.5 mg/0.5 mL PnIj; Inject 2.5 mg into the skin every 7 days.    Class 3 severe obesity due to excess calories with serious comorbidity and body mass index (BMI) of 40.0 to 44.9 in adult    Dementia,  unspecified dementia severity, unspecified dementia type, unspecified whether behavioral, psychotic, or mood disturbance or anxiety    Hypertension associated with diabetes  -     furosemide (LASIX) 20 MG tablet; Take 1 tablet (20 mg total) by mouth once daily.    Hyperlipidemia associated with type 2 diabetes mellitus  -     rosuvastatin (CRESTOR) 40 MG Tab; Take 1 tablet (40 mg total) by mouth every evening.          Medication List with Changes/Refills   New Medications    TIRZEPATIDE (MOUNJARO) 2.5 MG/0.5 ML PNIJ    Inject 2.5 mg into the skin every 7 days.       Start Date: 2/13/2025 End Date: 5/14/2025   Current Medications    ASPIRIN (ECOTRIN) 81 MG EC TABLET    Take 81 mg by mouth once daily.       Start Date: --        End Date: --    BLOOD SUGAR DIAGNOSTIC STRP    100 each by Other route once daily. USE TO CHECK FASTING CBG       Start Date: 10/11/2021End Date: --    BLOOD-GLUCOSE METER KIT    1 each by Other route once daily. USE TO CHECK CBG       Start Date: 10/11/2021End Date: --    DONEPEZIL (ARICEPT) 10 MG TABLET    Take 1 tablet (10 mg total) by mouth once daily.       Start Date: 7/15/2024 End Date: --    FLUTICASONE PROPIONATE (FLONASE) 50 MCG/ACTUATION NASAL SPRAY    USE 2 SPRAYS IN EACH NOSTRIL DAILY AS NEEDED FOR ALLERGY SYMPTOMS       Start Date: 7/8/2024  End Date: --    HYDRALAZINE (APRESOLINE) 100 MG TABLET    TAKE 1 TABLET BY MOUTH THREE TIMES A DAY       Start Date: 4/25/2024 End Date: --    LANCETS MISC    100 each by Other route once daily. USE TO CHECK FASTING CBG       Start Date: 10/11/2021End Date: --    LOSARTAN (COZAAR) 100 MG TABLET    TAKE 1 TABLET BY MOUTH EVERY DAY       Start Date: 9/3/2024  End Date: --    PIOGLITAZONE (ACTOS) 15 MG TABLET    TAKE 1 TABLET BY MOUTH EVERY DAY       Start Date: 7/30/2024 End Date: --    PREGABALIN (LYRICA) 75 MG CAPSULE    Take 1 capsule (75 mg total) by mouth 2 (two) times daily as needed (nerve pain).       Start Date: 11/13/2024End Date:  2/11/2025    VITAMIN D (VITAMIN D3) 1000 UNITS TAB    Take 1 tablet by mouth once daily.       Start Date: --        End Date: --   Changed and/or Refilled Medications    Modified Medication Previous Medication    FUROSEMIDE (LASIX) 20 MG TABLET furosemide (LASIX) 20 MG tablet       Take 1 tablet (20 mg total) by mouth once daily.    Take 1 tablet (20 mg total) by mouth once daily.       Start Date: 2/13/2025 End Date: --    Start Date: 5/3/2024  End Date: 2/13/2025    ROSUVASTATIN (CRESTOR) 40 MG TAB rosuvastatin (CRESTOR) 40 MG Tab       Take 1 tablet (40 mg total) by mouth every evening.    Take 40 mg by mouth once daily.       Start Date: 2/13/2025 End Date: 2/13/2026    Start Date: 1/10/2025 End Date: 2/13/2025   Discontinued Medications    PRAVASTATIN (PRAVACHOL) 80 MG TABLET    Take 1 tablet (80 mg total) by mouth every evening.       Start Date: 6/28/2023 End Date: 2/13/2025    SEMAGLUTIDE (OZEMPIC) 0.25 MG OR 0.5 MG (2 MG/3 ML) PEN INJECTOR    Inject 0.25 mg into the skin every 7 days.       Start Date: 1/13/2025 End Date: 2/13/2025          Follow up in about 3 months (around 5/13/2025) for Cholesterol Followup, Diabetes Followup, HTN Followup- 30 minute appointment.

## 2025-02-19 ENCOUNTER — TELEPHONE (OUTPATIENT)
Dept: FAMILY MEDICINE | Facility: CLINIC | Age: 85
End: 2025-02-19
Payer: MEDICARE

## 2025-02-19 ENCOUNTER — RESULTS FOLLOW-UP (OUTPATIENT)
Dept: FAMILY MEDICINE | Facility: CLINIC | Age: 85
End: 2025-02-19
Payer: MEDICARE

## 2025-02-19 DIAGNOSIS — E11.42 CONTROLLED TYPE 2 DIABETES MELLITUS WITH DIABETIC POLYNEUROPATHY, WITHOUT LONG-TERM CURRENT USE OF INSULIN: Primary | ICD-10-CM

## 2025-02-19 DIAGNOSIS — N39.0 BACTERIAL UTI: Primary | ICD-10-CM

## 2025-02-19 DIAGNOSIS — A49.9 BACTERIAL UTI: Primary | ICD-10-CM

## 2025-02-19 DIAGNOSIS — D70.9 NEUTROPENIA, UNSPECIFIED TYPE: ICD-10-CM

## 2025-02-19 LAB
ALBUMIN SERPL-MCNC: 4.4 G/DL (ref 3.7–4.7)
ALBUMIN/CREAT UR: NORMAL MG/G CREAT (ref 0–29)
ALP SERPL-CCNC: 68 IU/L (ref 44–121)
ALT SERPL-CCNC: 25 IU/L (ref 0–32)
APPEARANCE UR: CLEAR
AST SERPL-CCNC: 32 IU/L (ref 0–40)
BACTERIA #/AREA URNS HPF: NORMAL /[HPF]
BACTERIA UR CULT: ABNORMAL
BACTERIA UR CULT: ABNORMAL
BASOPHILS # BLD AUTO: 0 X10E3/UL (ref 0–0.2)
BASOPHILS NFR BLD AUTO: 1 %
BILIRUB SERPL-MCNC: 0.4 MG/DL (ref 0–1.2)
BILIRUB UR QL STRIP: NEGATIVE
BUN SERPL-MCNC: 24 MG/DL (ref 8–27)
BUN/CREAT SERPL: 27 (ref 12–28)
CALCIUM SERPL-MCNC: 9.5 MG/DL (ref 8.7–10.3)
CHLORIDE SERPL-SCNC: 104 MMOL/L (ref 96–106)
CHOLEST SERPL-MCNC: 171 MG/DL (ref 100–199)
CK SERPL-CCNC: 115 U/L (ref 26–161)
CO2 SERPL-SCNC: 24 MMOL/L (ref 20–29)
COLOR UR: YELLOW
CREAT SERPL-MCNC: 0.9 MG/DL (ref 0.57–1)
CREAT UR-MCNC: 117.2 MG/DL
CRYSTALS URNS MICRO: NORMAL
EOSINOPHIL # BLD AUTO: 0 X10E3/UL (ref 0–0.4)
EOSINOPHIL NFR BLD AUTO: 1 %
EPI CELLS #/AREA URNS HPF: NORMAL /HPF (ref 0–10)
ERYTHROCYTE [DISTWIDTH] IN BLOOD BY AUTOMATED COUNT: 13.6 % (ref 11.7–15.4)
EST. AVERAGE GLUCOSE BLD GHB EST-MCNC: 111 MG/DL
EST. GFR  (NO RACE VARIABLE): 64 ML/MIN/1.73
GLOBULIN SER CALC-MCNC: 2.9 G/DL (ref 1.5–4.5)
GLUCOSE SERPL-MCNC: 98 MG/DL (ref 70–99)
GLUCOSE UR QL STRIP: NEGATIVE
HBA1C MFR BLD: 5.5 % (ref 4.8–5.6)
HCT VFR BLD AUTO: 40.5 % (ref 34–46.6)
HDLC SERPL-MCNC: 62 MG/DL
HGB BLD-MCNC: 12.5 G/DL (ref 11.1–15.9)
HGB UR QL STRIP: NEGATIVE
KETONES UR QL STRIP: NEGATIVE
LDLC SERPL CALC-MCNC: 97 MG/DL (ref 0–99)
LEUKOCYTE ESTERASE UR QL STRIP: ABNORMAL
LYMPHOCYTES # BLD AUTO: 1.2 X10E3/UL (ref 0.7–3.1)
LYMPHOCYTES NFR BLD AUTO: 42 %
MCH RBC QN AUTO: 30.5 PG (ref 26.6–33)
MCHC RBC AUTO-ENTMCNC: 30.9 G/DL (ref 31.5–35.7)
MCV RBC AUTO: 99 FL (ref 79–97)
MICRO URNS: ABNORMAL
MICROALBUMIN UR-MCNC: <12 UG/ML
MONOCYTES # BLD AUTO: 0.4 X10E3/UL (ref 0.1–0.9)
MONOCYTES NFR BLD AUTO: 12 %
NEUTROPHILS # BLD AUTO: 1.3 X10E3/UL (ref 1.4–7)
NEUTROPHILS NFR BLD AUTO: 44 %
NITRITE UR QL STRIP: NEGATIVE
OTHER ANTIBIOTIC SUSC ISLT: ABNORMAL
PH UR STRIP: 6 [PH] (ref 5–7.5)
PLATELET # BLD AUTO: 280 X10E3/UL (ref 150–450)
POTASSIUM SERPL-SCNC: 4.3 MMOL/L (ref 3.5–5.2)
PROT SERPL-MCNC: 7.3 G/DL (ref 6–8.5)
PROT UR QL STRIP: NEGATIVE
RBC # BLD AUTO: 4.1 X10E6/UL (ref 3.77–5.28)
RBC #/AREA URNS HPF: NORMAL /HPF (ref 0–2)
SODIUM SERPL-SCNC: 143 MMOL/L (ref 134–144)
SP GR UR STRIP: 1.02 (ref 1–1.03)
SPECIMEN STATUS REPORT: NORMAL
TRIGL SERPL-MCNC: 63 MG/DL (ref 0–149)
TSH SERPL DL<=0.005 MIU/L-ACNC: 2.59 UIU/ML (ref 0.45–4.5)
URINALYSIS REFLEX: ABNORMAL
UROBILINOGEN UR STRIP-MCNC: 0.2 MG/DL (ref 0.2–1)
VLDLC SERPL CALC-MCNC: 12 MG/DL (ref 5–40)
WBC # BLD AUTO: 3 X10E3/UL (ref 3.4–10.8)
WBC #/AREA URNS HPF: NORMAL /HPF (ref 0–5)

## 2025-02-19 RX ORDER — AMOXICILLIN AND CLAVULANATE POTASSIUM 875; 125 MG/1; MG/1
1 TABLET, FILM COATED ORAL EVERY 12 HOURS
Qty: 14 TABLET | Refills: 0 | Status: SHIPPED | OUTPATIENT
Start: 2025-02-19 | End: 2025-02-26

## 2025-02-19 NOTE — TELEPHONE ENCOUNTER
----- Message from Nurse Pat sent at 2/19/2025 12:59 PM CST -----  Please advise  ----- Message -----  From: Beba Quesada  Sent: 2/19/2025   9:56 AM CST  To: Marisa [facility] Staff    Who Called: Desire Radha is requesting assistance/information from provider's office.Symptoms (please be specific):  How long has patient had these symptoms:  List of preferred pharmacies on file (remove unneeded): [unfilled]If different, enter pharmacy into here including location and phone number: Preferred Method of Contact: Phone CallPatient's Preferred Phone Number on File: [phone] Best Call Back Number, if different:Additional Information: Pt called requesting to speak with nurse about nest course of action since she isn't able to get the  tirzepatide (MOUNJARO) 2.5 mg/0.5 mL PnIj because of the price. Pt also missed a dose of meds since she is out

## 2025-02-19 NOTE — TELEPHONE ENCOUNTER
----- Message from Beba sent at 2/19/2025  8:58 AM CST -----  Who Called: Desire ConnorsMickjose juan is requesting assistance/information from provider's office.Symptoms (please be specific):  How long has patient had these symptoms:  List of preferred pharmacies on file (remove unneeded): [unfilled]If different, enter pharmacy into here including location and phone number: Preferred Method of Contact: Phone CallPatient's Preferred Phone Number on File: [phone] Best Call Back Number, if different:Additional Information: Pt call requesting to speak with nurse about possibly receiving  MOUNJARO samples, because meds has became to expensive and she is out of meds  ----- Message -----  From: Beba Quesada  Sent: 2/19/2025   9:00 AM CST  To: Marisa [facility] Staff    Who Called: Desire Radha is requesting assistance/information from provider's office.Symptoms (please be specific):  How long has patient had these symptoms:  List of preferred pharmacies on file (remove unneeded): [unfilled]If different, enter pharmacy into here including location and phone number: Preferred Method of Contact: Phone CallPatient's Preferred Phone Number on File: [phone] Best Call Back Number, if different:Additional Information: Pt call requesting to speak with nurse about possibly receiving  MOUNJARO samples, because meds has became to expensive and she is out of meds

## 2025-03-02 LAB
APPEARANCE UR: CLEAR
BACTERIA #/AREA URNS HPF: ABNORMAL /[HPF]
BACTERIA UR CULT: ABNORMAL
BACTERIA UR CULT: ABNORMAL
BASOPHILS # BLD AUTO: 0 X10E3/UL (ref 0–0.2)
BASOPHILS NFR BLD AUTO: 1 %
BILIRUB UR QL STRIP: NEGATIVE
COLOR UR: YELLOW
CRYSTALS URNS MICRO: ABNORMAL
EOSINOPHIL # BLD AUTO: 0 X10E3/UL (ref 0–0.4)
EOSINOPHIL NFR BLD AUTO: 1 %
EPI CELLS #/AREA URNS HPF: >10 /HPF (ref 0–10)
ERYTHROCYTE [DISTWIDTH] IN BLOOD BY AUTOMATED COUNT: 14 % (ref 11.7–15.4)
GLUCOSE UR QL STRIP: NEGATIVE
HCT VFR BLD AUTO: 37.4 % (ref 34–46.6)
HGB BLD-MCNC: 11.7 G/DL (ref 11.1–15.9)
HGB UR QL STRIP: NEGATIVE
IMM GRANULOCYTES NFR BLD AUTO: 0 %
KETONES UR QL STRIP: NEGATIVE
LEUKOCYTE ESTERASE UR QL STRIP: ABNORMAL
LYMPHOCYTES # BLD AUTO: 1 X10E3/UL (ref 0.7–3.1)
LYMPHOCYTES NFR BLD AUTO: 30 %
MCH RBC QN AUTO: 30.5 PG (ref 26.6–33)
MCHC RBC AUTO-ENTMCNC: 31.3 G/DL (ref 31.5–35.7)
MCV RBC AUTO: 98 FL (ref 79–97)
MICRO URNS: ABNORMAL
MONOCYTES # BLD AUTO: 0.4 X10E3/UL (ref 0.1–0.9)
MONOCYTES NFR BLD AUTO: 11 %
NEUTROPHILS # BLD AUTO: 2 X10E3/UL (ref 1.4–7)
NEUTROPHILS NFR BLD AUTO: 57 %
NITRITE UR QL STRIP: NEGATIVE
OTHER ANTIBIOTIC SUSC ISLT: ABNORMAL
PH UR STRIP: 6 [PH] (ref 5–7.5)
PLATELET # BLD AUTO: 257 X10E3/UL (ref 150–450)
PROT UR QL STRIP: ABNORMAL
RBC # BLD AUTO: 3.83 X10E6/UL (ref 3.77–5.28)
RBC #/AREA URNS HPF: ABNORMAL /HPF (ref 0–2)
SP GR UR STRIP: 1.03 (ref 1–1.03)
URINALYSIS REFLEX: ABNORMAL
UROBILINOGEN UR STRIP-MCNC: 0.2 MG/DL (ref 0.2–1)
WBC # BLD AUTO: 3.4 X10E3/UL (ref 3.4–10.8)
WBC #/AREA URNS HPF: ABNORMAL /HPF (ref 0–5)

## 2025-03-03 ENCOUNTER — RESULTS FOLLOW-UP (OUTPATIENT)
Dept: FAMILY MEDICINE | Facility: CLINIC | Age: 85
End: 2025-03-03
Payer: MEDICARE

## 2025-03-03 DIAGNOSIS — N30.00 ACUTE CYSTITIS WITHOUT HEMATURIA: Primary | ICD-10-CM

## 2025-03-03 RX ORDER — DOXYCYCLINE 100 MG/1
100 CAPSULE ORAL EVERY 12 HOURS
Qty: 14 CAPSULE | Refills: 0 | Status: SHIPPED | OUTPATIENT
Start: 2025-03-03 | End: 2025-03-10

## 2025-03-03 NOTE — PROGRESS NOTES
Urinalysis demonstrates that she has some leukocytes in urine   Urine culture demonstrates sensitive to ciprofloxacin/Levaquin but patient is allergic to fluoroquinolones   Start doxycycline as prescribed  Encourage  to increase hydration  ER precautions-if patient is experiencing any fever not getting better with ibuprofen/Tylenol, suprapubic pain, back pain, blood in urine then report to ER and notify MD

## 2025-03-05 NOTE — TELEPHONE ENCOUNTER
----- Message from Myah Deshpande MD sent at 3/3/2025  5:17 PM CST -----  Urinalysis demonstrates that she has some leukocytes in urine   Urine culture demonstrates sensitive to ciprofloxacin/Levaquin but patient is allergic to fluoroquinolones   Start doxycycline as prescribed  Encourage  to increase hydration  ER precautions-if patient is experiencing any fever not getting better with ibuprofen/Tylenol, suprapubic pain, back pain, blood in urine then report to ER and notify MD    ----- Message -----  From: Saima Humphreys  Sent: 3/2/2025   2:08 PM CST  To: Lotus Cunningham MD

## 2025-05-08 DIAGNOSIS — E11.42 CONTROLLED TYPE 2 DIABETES MELLITUS WITH DIABETIC POLYNEUROPATHY, WITHOUT LONG-TERM CURRENT USE OF INSULIN: ICD-10-CM

## 2025-05-08 RX ORDER — TIRZEPATIDE 2.5 MG/.5ML
INJECTION, SOLUTION SUBCUTANEOUS
Refills: 2 | OUTPATIENT
Start: 2025-05-08

## 2025-05-12 DIAGNOSIS — E11.42 CONTROLLED TYPE 2 DIABETES MELLITUS WITH DIABETIC POLYNEUROPATHY, WITHOUT LONG-TERM CURRENT USE OF INSULIN: ICD-10-CM

## 2025-05-12 RX ORDER — TIRZEPATIDE 2.5 MG/.5ML
2.5 INJECTION, SOLUTION SUBCUTANEOUS
Qty: 2 ML | Refills: 2 | Status: SHIPPED | OUTPATIENT
Start: 2025-05-12 | End: 2025-08-10

## 2025-05-12 NOTE — TELEPHONE ENCOUNTER
Copied from CRM #[id number]. Topic: Medications - Medication Refill  >> May 9, 2025 12:20 PM Elaine wrote:  .Who Called: Desire Vital    Refill or New Rx:Refill  RX Name and Strength:tirzepatide (MOUNJARO) 2.5 mg/0.5 mL PnIj  How is the patient currently taking it? (ex. 1XDay):1x per week  Is this a 30 day or 90 day RX:30  Local or Mail Order:local  List of preferred pharmacies on file (remove unneeded): [facility] #[id number] - [place], [place]  If different Pharmacy is requested, enter Pharmacy information here including location and phone number:    Ordering Provider:darrian      Preferred Method of Contact: Phone Call  Patient's Preferred Phone Number on File: [phone]   Best Call Back Number, if different:  Additional Information:  tirzepatide (MOUNJARO) 2.5 mg/0.5 mL PnIj    Patient requested a refill on this medication sent to the pharmacy.

## 2025-06-05 DIAGNOSIS — I10 HYPERTENSION, UNSPECIFIED TYPE: ICD-10-CM

## 2025-06-05 RX ORDER — HYDRALAZINE HYDROCHLORIDE 100 MG/1
100 TABLET, FILM COATED ORAL 3 TIMES DAILY
Qty: 270 TABLET | Refills: 3 | Status: SHIPPED | OUTPATIENT
Start: 2025-06-05

## 2025-07-09 ENCOUNTER — PATIENT OUTREACH (OUTPATIENT)
Facility: CLINIC | Age: 85
End: 2025-07-09
Payer: MEDICARE

## 2025-07-09 NOTE — PROGRESS NOTES
Health Maintenance Topic(s) Outreach Outcomes & Actions Taken:    Primary Care Appt - Outreach Outcomes & Actions Taken  : Message sent to pcp clinic to schedule AWV       Additional Notes:

## 2025-08-04 DIAGNOSIS — E11.42 CONTROLLED TYPE 2 DIABETES MELLITUS WITH DIABETIC POLYNEUROPATHY, WITHOUT LONG-TERM CURRENT USE OF INSULIN: ICD-10-CM

## 2025-08-04 RX ORDER — TIRZEPATIDE 2.5 MG/.5ML
2.5 INJECTION, SOLUTION SUBCUTANEOUS
Qty: 2 ML | Refills: 2 | Status: SHIPPED | OUTPATIENT
Start: 2025-08-04 | End: 2025-11-02

## 2025-08-18 DIAGNOSIS — E11.42 CONTROLLED TYPE 2 DIABETES MELLITUS WITH DIABETIC POLYNEUROPATHY, WITHOUT LONG-TERM CURRENT USE OF INSULIN: ICD-10-CM

## 2025-08-18 RX ORDER — PIOGLITAZONE 15 MG/1
15 TABLET ORAL
Qty: 90 TABLET | Refills: 3 | Status: SHIPPED | OUTPATIENT
Start: 2025-08-18